# Patient Record
Sex: FEMALE | Race: WHITE | NOT HISPANIC OR LATINO | Employment: FULL TIME | URBAN - METROPOLITAN AREA
[De-identification: names, ages, dates, MRNs, and addresses within clinical notes are randomized per-mention and may not be internally consistent; named-entity substitution may affect disease eponyms.]

---

## 2018-08-01 ENCOUNTER — HOSPITAL ENCOUNTER (EMERGENCY)
Facility: HOSPITAL | Age: 28
Discharge: HOME | End: 2018-08-01
Attending: EMERGENCY MEDICINE
Payer: MEDICAID

## 2018-08-01 VITALS
OXYGEN SATURATION: 97 % | HEIGHT: 63 IN | BODY MASS INDEX: 33.31 KG/M2 | DIASTOLIC BLOOD PRESSURE: 71 MMHG | RESPIRATION RATE: 20 BRPM | TEMPERATURE: 99 F | WEIGHT: 188 LBS | HEART RATE: 86 BPM | SYSTOLIC BLOOD PRESSURE: 125 MMHG

## 2018-08-01 DIAGNOSIS — J40 BRONCHITIS: Primary | ICD-10-CM

## 2018-08-01 LAB — DEPRECATED S PYO AG THROAT QL EIA: NEGATIVE

## 2018-08-01 PROCEDURE — 87880 STREP A ASSAY W/OPTIC: CPT

## 2018-08-01 PROCEDURE — 99284 EMERGENCY DEPT VISIT MOD MDM: CPT

## 2018-08-01 PROCEDURE — 87081 CULTURE SCREEN ONLY: CPT

## 2018-08-01 RX ORDER — AMOXICILLIN AND CLAVULANATE POTASSIUM 875; 125 MG/1; MG/1
1 TABLET, FILM COATED ORAL 2 TIMES DAILY
Qty: 14 TABLET | Refills: 0 | Status: SHIPPED | OUTPATIENT
Start: 2018-08-01 | End: 2023-04-27 | Stop reason: DRUGHIGH

## 2018-08-01 RX ORDER — METHYLPREDNISOLONE 4 MG/1
TABLET ORAL
Qty: 1 PACKAGE | Refills: 0 | Status: SHIPPED | OUTPATIENT
Start: 2018-08-01 | End: 2018-08-22

## 2018-08-01 RX ORDER — ALBUTEROL SULFATE 90 UG/1
1-2 AEROSOL, METERED RESPIRATORY (INHALATION) EVERY 6 HOURS PRN
Qty: 1 INHALER | Refills: 0 | Status: SHIPPED | OUTPATIENT
Start: 2018-08-01 | End: 2023-06-29

## 2018-08-02 NOTE — ED PROVIDER NOTES
Encounter Date: 8/1/2018       History     Chief Complaint   Patient presents with    Nasal Congestion     Nasal congestion with sore throat and nonproductive cough X approx 2 weeks PTA.    Sore Throat    Cough     Patient is a 28-year-old female smoker with a 2 week history of nasal congestion draining down to the back of her throat, sore throat and resulting cough.  She states that she initially started improving and then over the weekend started relapsing again.          Review of patient's allergies indicates:  No Known Allergies  Past Medical History:   Diagnosis Date    Gastroenteritis     Thyroid condition      History reviewed. No pertinent surgical history.  History reviewed. No pertinent family history.  Social History   Substance Use Topics    Smoking status: Current Every Day Smoker     Packs/day: 0.50     Types: Cigarettes    Smokeless tobacco: Not on file    Alcohol use Yes      Comment: socially     Review of Systems   Constitutional: Negative for diaphoresis, fatigue and fever.        14 Point ROS completed and negative with the exception of HPI and Below.   HENT: Positive for postnasal drip, sinus pressure, sneezing and sore throat. Negative for congestion, ear pain, hearing loss, trouble swallowing and voice change.    Eyes: Negative for discharge and itching.   Respiratory: Positive for cough. Negative for chest tightness, shortness of breath and wheezing.    Cardiovascular: Negative for chest pain, palpitations and leg swelling.   Gastrointestinal: Negative for abdominal distention, abdominal pain, nausea and vomiting.   Genitourinary: Negative for dysuria, flank pain and frequency.   Musculoskeletal: Negative for back pain, neck pain and neck stiffness.   Skin: Negative for pallor, rash and wound.   Neurological: Negative for dizziness, syncope, facial asymmetry, weakness and headaches.   Hematological: Does not bruise/bleed easily.   Psychiatric/Behavioral: Negative for agitation,  behavioral problems and confusion.   All other systems reviewed and are negative.      Physical Exam     Initial Vitals [08/01/18 1921]   BP Pulse Resp Temp SpO2   (!) 144/73 95 20 98.9 °F (37.2 °C) 98 %      MAP       --         Physical Exam    Constitutional: She appears well-developed and well-nourished.   HENT:   Head: Normocephalic and atraumatic.   Right Ear: External ear normal.   Left Ear: External ear normal.   Lower turbinates erythematous with clear drainage.  Pharynx is slightly erythematous.   Eyes: Conjunctivae and EOM are normal. Pupils are equal, round, and reactive to light. Right eye exhibits no discharge. Left eye exhibits no discharge. No scleral icterus.   Neck: Normal range of motion. Neck supple. No thyromegaly present. No tracheal deviation present. No JVD present.   Cardiovascular: Normal rate, regular rhythm, normal heart sounds and intact distal pulses. Exam reveals no gallop and no friction rub.    No murmur heard.  Pulmonary/Chest: No stridor. No respiratory distress. She has wheezes. She has no rhonchi. She has no rales. She exhibits no tenderness.   Bronchial wheeze with nonproductive cough otherwise clear.   Abdominal: Soft. Bowel sounds are normal. She exhibits no distension and no mass. There is no tenderness. There is no rebound and no guarding.   Musculoskeletal: Normal range of motion. She exhibits no edema or tenderness.   Lymphadenopathy:     She has no cervical adenopathy.   Neurological: She is alert and oriented to person, place, and time. She has normal strength and normal reflexes. She displays normal reflexes. No cranial nerve deficit or sensory deficit.   Skin: Skin is warm and dry. Capillary refill takes less than 2 seconds. No rash and no abscess noted. No erythema. No pallor.   Psychiatric: She has a normal mood and affect. Her behavior is normal. Thought content normal.         ED Course   Procedures  Labs Reviewed   THROAT SCREEN, RAPID   CULTURE, STREP A,  THROAT           Imaging Results    None          Medical Decision Making:   Initial Assessment:   Patient is a 28-year-old female smoker with a 2 week history of nasal congestion draining down to the back of her throat, sore throat and resulting cough.  She states that she initially started improving and then over the weekend started relapsing again.  On exam the patient is awake, alert, oriented x3 with a nonproductive cough and bronchial wheeze.  Lower turbinates are slightly erythematous with clear drainage.  Pharynx has mild erythema noted.  Differential Diagnosis:   Sinusitis, upper respiratory infection, bronchitis                      Clinical Impression:   The encounter diagnosis was Bronchitis.                             Sami Acevedo PA-C  08/01/18 [id number]

## 2018-08-04 LAB — BACTERIA THROAT CULT: NORMAL

## 2020-04-02 ENCOUNTER — OFFICE VISIT (OUTPATIENT)
Dept: URGENT CARE | Facility: CLINIC | Age: 30
End: 2020-04-02
Payer: MEDICAID

## 2020-04-02 VITALS
WEIGHT: 180 LBS | TEMPERATURE: 99 F | BODY MASS INDEX: 33.13 KG/M2 | HEART RATE: 83 BPM | OXYGEN SATURATION: 98 % | HEIGHT: 62 IN

## 2020-04-02 DIAGNOSIS — M72.2 PLANTAR FASCIITIS OF RIGHT FOOT: Primary | ICD-10-CM

## 2020-04-02 DIAGNOSIS — S99.921A FOOT INJURY, RIGHT, INITIAL ENCOUNTER: ICD-10-CM

## 2020-04-02 PROCEDURE — 99214 OFFICE O/P EST MOD 30 MIN: CPT | Mod: S$GLB,,, | Performed by: PHYSICIAN ASSISTANT

## 2020-04-02 PROCEDURE — 99214 PR OFFICE/OUTPT VISIT, EST, LEVL IV, 30-39 MIN: ICD-10-PCS | Mod: S$GLB,,, | Performed by: PHYSICIAN ASSISTANT

## 2020-04-02 PROCEDURE — 73630 X-RAY EXAM OF FOOT: CPT | Mod: FY,RT,S$GLB, | Performed by: RADIOLOGY

## 2020-04-02 PROCEDURE — 73630 XR FOOT COMPLETE 3 VIEW RIGHT: ICD-10-PCS | Mod: FY,RT,S$GLB, | Performed by: RADIOLOGY

## 2020-04-03 NOTE — PATIENT INSTRUCTIONS
PLEASE READ YOUR DISCHARGE INSTRUCTIONS ENTIRELY AS IT CONTAINS IMPORTANT INFORMATION.  - Rest.    - Drink plenty of fluids.    - Tylenol as directed as needed for fever/pain.    - If you were prescribed antibiotics, please take them to completion.  - If you are female and on birth control pills - please use additional methods of contraception to prevent pregnancy while on antibiotics and for one cycle after.   - If you were prescribed a narcotic medication or muscle relaxer, do not drive or operate heavy equipment or machinery while taking these medications, as they can cause drowsiness.   - If you smoke, please stop smoking.  -You must understand that you've received an Urgent Care treatment only and that you may be released before all your medical problems are known or treated. You, the patient, will    arrange for follow up care as instructed. Please arrange follow up with your primary medical clinic as soon as possible.   - Follow up with your PCP or specialty clinic as directed in the next 1-2 weeks if not improved or as needed.  You can call [phone] to schedule an appointment with the appropriate provider.    - Please return to Urgent Care or to the Emergency Department if your symptoms worsen.    Patient aware and verbalized understanding.    Plantar Fasciitis  Plantar fasciitis is a painful swelling of the plantar fascia. The plantar fascia is a thick, fibrous layer of tissue. It covers the bones on the bottom of your foot. And it supports the foot bones in an arched position.  This can happen gradually or suddenly. It usually affects one foot at a time. Heel pain can be sharp, like a knife sticking into the bottom of your foot. You may feel pain after exercising, long-distance jogging, stair climbing, long periods of standing, or after standing up.  Risk factors include: non-active lifestyle, arthritis, diabetes, obesity or recent weight gain, flat foot, high arch. Wearing high heels, loose shoes,  or shoes with poor arch support for long periods of time adds to the risk. This problem is commonly found in runners and dancers. It also found in people who stand on hard surfaces for long periods of time.  Foot pain from this condition is usually worse in the morning. But it improves with walking. By the end of the day there may be a dull aching. Treatment requires short-term rest and controlling swelling. It may take up to 9 months before all symptoms go away. Rarely, a steroid injection into the foot, or surgery, may be needed.  Home care  · If you are overweight, lose weight to help healing.  · Choose supportive shoes with good arch support and shock absorbency. Replace athletic shoes when they become worn out. Dont walk or run barefoot.  · Premade or custom-fitted shoe inserts may be helpful. Inserts made of silicone seem to be the most effective. Custom-made inserts can be provided by a podiatrist or foot specialist, physical therapist, or orthopedist.  · Premade or custom-made night splints keep the heel stretched out while you sleep. They may prevent morning pain.  · Avoid activities that stress the feet: jogging, prolonged standing or walking, contact sports, etc.  · First thing in the morning and before sports, stretch the bottom of your feet. Gently flex your ankle so the toes move toward your knee.  · Icing may help control heel pain. Apply an ice pack to the heel for 10-20 minutes as a preventive. Or ice your heel after a severe flare-up of symptoms. You may repeat this every 1-2 hours as needed.  · You may use over-the-counter pain medicine to control pain, unless another medicine was prescribed. Anti-inflammatory pain medicines, such as ibuprofen or naproxen, may work better than acetaminophen. If you have chronic liver or kidney disease or ever had a stomach ulcer or GI bleeding, talk with your healthcare provider before using these medicines.  Follow-up care  Follow up with your healthcare  provider, physical therapist, or podiatrist or foot specialist as advised.  Call for an appointment if pain worsens or there is no relief after a few weeks of home treatment. Shoe inserts, a night splint, or a special boot may be required.  If X-rays were taken, you will be told of any new findings that may affect your care.  When to seek medical advice  Call your healthcare provider right away if any of these occur:  · Foot swelling  · Redness with increasing pain  Date Last Reviewed: 11/21/2015 © 2000-2017 [employer]. [address] [place] [id number]. All rights reserved. This information is not intended as a substitute for professional medical care. Always follow your healthcare professional's instructions.        Treating Plantar Fasciitis  First, your healthcare provider tries to determine the cause of your problem in order to suggest ways to relieve pain. If your pain is due to poor foot mechanics, custom-made shoe inserts (orthoses) may help.    Reduce symptoms  · To relieve mild symptoms, try aspirin, ibuprofen, or other medicines as directed. Rubbing ice on the affected area may also help.  · To reduce severe pain and swelling, your healthcare provider may prescribe pills or injections or a walking cast in some instances. Physical therapy, such as ultrasound or a daily stretching program, may also be recommended. Surgery is rarely required.  · To reduce symptoms caused by poor foot mechanics, your foot may be taped. This supports the arch and temporarily controls movement. Night splints may also help by stretching the fascia.  Control movement  If taping helps, your healthcare provider may prescribe orthoses. Built from plaster casts of your feet, these inserts control the way your foot moves. As a result, your symptoms should go away.  Reduce overuse  Every time your foot strikes the ground, the plantar fascia is stretched. You can reduce the strain on the plantar fascia and the  possibility of overuse by following these suggestions:  · Lose any excess weight.  · Avoid running on hard or uneven ground.  · Use orthoses at all times in your shoes and house slippers.  If surgery is needed  Your healthcare provider may consider surgery if other types of treatment don't control your pain. During surgery, the plantar fascia is partially cut to release tension. As you heal, fibrous tissue fills the space between the heel bone and the plantar fascia.   Date Last Reviewed: 10/14/2015  © 0738-9314 [employer]. [address] [place] [address]. All rights reserved. This information is not intended as a substitute for professional medical care. Always follow your healthcare professional's instructions.

## 2020-04-03 NOTE — PROGRESS NOTES
"Subjective:       Patient ID: Nirali Chavez is a 30 y.o. female.    Vitals:  height is 5' 2" (1.575 m) and weight is 81.6 kg (180 lb). Her temperature is 98.8 °F (37.1 °C). Her pulse is 83. Her oxygen saturation is 98%.     Chief Complaint: Foot Injury (Right Foot )    Ms. Chavez presents for evaluation of right foot pain.  She reports she is a  and was doing a lot of walking during [place].  She denies any trauma.  She has had pain since that time.  She is having pain on the bottom of her foot and in her heel.  Pain is worse with walking.  She does have shoe insoles that she wears on a regular basis.  She has tried ibuprofen for her symptoms without relief.    Foot Injury    The incident occurred more than 1 week ago (02/25/2020). The incident occurred in the street. The injury mechanism is unknown. The pain is present in the right foot. The pain is at a severity of 6/10. The pain is moderate. The pain has been worsening since onset. Pertinent negatives include no inability to bear weight. She reports no foreign bodies present. The symptoms are aggravated by weight bearing. She has tried nothing for the symptoms. The treatment provided no relief.       Constitution: Negative for fatigue.   HENT: Negative for facial swelling and facial trauma.    Neck: Negative for neck stiffness.   Cardiovascular: Negative for chest trauma.   Eyes: Negative for eye trauma, double vision and blurred vision.   Gastrointestinal: Negative for abdominal trauma, abdominal pain and rectal bleeding.   Genitourinary: Negative for hematuria, missed menses, genital trauma and pelvic pain.   Musculoskeletal: Positive for pain. Negative for trauma, joint pain, joint swelling and abnormal ROM of joint.   Skin: Negative for color change, wound, abrasion, laceration and bruising.   Neurological: Negative for dizziness, history of vertigo, light-headedness, coordination disturbances, altered mental status and loss of " consciousness.   Hematologic/Lymphatic: Negative for history of bleeding disorder.   Psychiatric/Behavioral: Negative for altered mental status.       Objective:      Physical Exam   Constitutional: She is oriented to person, place, and time. She appears well-developed and well-nourished. She is cooperative.  Non-toxic appearance. She does not appear ill. No distress.   HENT:   Head: Normocephalic and atraumatic.   Right Ear: Hearing and external ear normal.   Left Ear: Hearing and external ear normal.   Nose: Nose normal. No rhinorrhea or nasal deformity. No epistaxis.   Mouth/Throat: Mucous membranes are normal.   Eyes: Conjunctivae and lids are normal. Right eye exhibits no discharge. Left eye exhibits no discharge. No scleral icterus.   Neck: Trachea normal, normal range of motion, full passive range of motion without pain and phonation normal. Neck supple.   Cardiovascular: Normal rate, regular rhythm, normal heart sounds, intact distal pulses and normal pulses.   Pulmonary/Chest: Effort normal and breath sounds normal. No stridor. No respiratory distress. She has no decreased breath sounds. She has no wheezes. She has no rhonchi. She has no rales.   Abdominal: Soft. Normal appearance and bowel sounds are normal. She exhibits no distension, no pulsatile midline mass and no mass. There is no tenderness.   Musculoskeletal: Normal range of motion. She exhibits no edema or deformity.        Right foot: There is tenderness. There is normal range of motion, no bony tenderness, no swelling, normal capillary refill, no crepitus, no deformity and no laceration.        Feet:    TTP right plantar fascia    Neurological: She is alert and oriented to person, place, and time. She exhibits normal muscle tone. Coordination normal.   Skin: Skin is warm, dry, intact, not diaphoretic and not pale. not right foot  Psychiatric: She has a normal mood and affect. Her speech is normal and behavior is normal. Judgment and thought  content normal. Cognition and memory are normal.   Nursing note and vitals reviewed.      XR R foot - No acute bony abnormality.    Assessment:       1. Plantar fasciitis of right foot    2. Foot injury, right, initial encounter        Plan:         Plantar fasciitis of right foot    Foot injury, right, initial encounter  -     XR FOOT COMPLETE 3 VIEW RIGHT; Future; Expected date: 04/02/2020    Patient given stretching instructions.  Also suggested a plantar fasciitis support.     Patient Instructions     PLEASE READ YOUR DISCHARGE INSTRUCTIONS ENTIRELY AS IT CONTAINS IMPORTANT INFORMATION.  - Rest.    - Drink plenty of fluids.    - Tylenol as directed as needed for fever/pain.    - If you were prescribed antibiotics, please take them to completion.  - If you are female and on birth control pills - please use additional methods of contraception to prevent pregnancy while on antibiotics and for one cycle after.   - If you were prescribed a narcotic medication or muscle relaxer, do not drive or operate heavy equipment or machinery while taking these medications, as they can cause drowsiness.   - If you smoke, please stop smoking.  -You must understand that you've received an Urgent Care treatment only and that you may be released before all your medical problems are known or treated. You, the patient, will    arrange for follow up care as instructed. Please arrange follow up with your primary medical clinic as soon as possible.   - Follow up with your PCP or specialty clinic as directed in the next 1-2 weeks if not improved or as needed.  You can call [phone] to schedule an appointment with the appropriate provider.    - Please return to Urgent Care or to the Emergency Department if your symptoms worsen.    Patient aware and verbalized understanding.    Plantar Fasciitis  Plantar fasciitis is a painful swelling of the plantar fascia. The plantar fascia is a thick, fibrous layer of tissue. It covers the bones on  the bottom of your foot. And it supports the foot bones in an arched position.  This can happen gradually or suddenly. It usually affects one foot at a time. Heel pain can be sharp, like a knife sticking into the bottom of your foot. You may feel pain after exercising, long-distance jogging, stair climbing, long periods of standing, or after standing up.  Risk factors include: non-active lifestyle, arthritis, diabetes, obesity or recent weight gain, flat foot, high arch. Wearing high heels, loose shoes, or shoes with poor arch support for long periods of time adds to the risk. This problem is commonly found in runners and dancers. It also found in people who stand on hard surfaces for long periods of time.  Foot pain from this condition is usually worse in the morning. But it improves with walking. By the end of the day there may be a dull aching. Treatment requires short-term rest and controlling swelling. It may take up to 9 months before all symptoms go away. Rarely, a steroid injection into the foot, or surgery, may be needed.  Home care  · If you are overweight, lose weight to help healing.  · Choose supportive shoes with good arch support and shock absorbency. Replace athletic shoes when they become worn out. Dont walk or run barefoot.  · Premade or custom-fitted shoe inserts may be helpful. Inserts made of silicone seem to be the most effective. Custom-made inserts can be provided by a podiatrist or foot specialist, physical therapist, or orthopedist.  · Premade or custom-made night splints keep the heel stretched out while you sleep. They may prevent morning pain.  · Avoid activities that stress the feet: jogging, prolonged standing or walking, contact sports, etc.  · First thing in the morning and before sports, stretch the bottom of your feet. Gently flex your ankle so the toes move toward your knee.  · Icing may help control heel pain. Apply an ice pack to the heel for 10-20 minutes as a preventive. Or  ice your heel after a severe flare-up of symptoms. You may repeat this every 1-2 hours as needed.  · You may use over-the-counter pain medicine to control pain, unless another medicine was prescribed. Anti-inflammatory pain medicines, such as ibuprofen or naproxen, may work better than acetaminophen. If you have chronic liver or kidney disease or ever had a stomach ulcer or GI bleeding, talk with your healthcare provider before using these medicines.  Follow-up care  Follow up with your healthcare provider, physical therapist, or podiatrist or foot specialist as advised.  Call for an appointment if pain worsens or there is no relief after a few weeks of home treatment. Shoe inserts, a night splint, or a special boot may be required.  If X-rays were taken, you will be told of any new findings that may affect your care.  When to seek medical advice  Call your healthcare provider right away if any of these occur:  · Foot swelling  · Redness with increasing pain  Date Last Reviewed: 11/21/2015  © 7050-6201 [employer]. [address] [place] [address]. All rights reserved. This information is not intended as a substitute for professional medical care. Always follow your healthcare professional's instructions.        Treating Plantar Fasciitis  First, your healthcare provider tries to determine the cause of your problem in order to suggest ways to relieve pain. If your pain is due to poor foot mechanics, custom-made shoe inserts (orthoses) may help.    Reduce symptoms  · To relieve mild symptoms, try aspirin, ibuprofen, or other medicines as directed. Rubbing ice on the affected area may also help.  · To reduce severe pain and swelling, your healthcare provider may prescribe pills or injections or a walking cast in some instances. Physical therapy, such as ultrasound or a daily stretching program, may also be recommended. Surgery is rarely required.  · To reduce symptoms caused by poor foot  mechanics, your foot may be taped. This supports the arch and temporarily controls movement. Night splints may also help by stretching the fascia.  Control movement  If taping helps, your healthcare provider may prescribe orthoses. Built from plaster casts of your feet, these inserts control the way your foot moves. As a result, your symptoms should go away.  Reduce overuse  Every time your foot strikes the ground, the plantar fascia is stretched. You can reduce the strain on the plantar fascia and the possibility of overuse by following these suggestions:  · Lose any excess weight.  · Avoid running on hard or uneven ground.  · Use orthoses at all times in your shoes and house slippers.  If surgery is needed  Your healthcare provider may consider surgery if other types of treatment don't control your pain. During surgery, the plantar fascia is partially cut to release tension. As you heal, fibrous tissue fills the space between the heel bone and the plantar fascia.   Date Last Reviewed: 10/14/2015  © 3825-8805 [employer]. [address] [place] [address], [place] [address]. All rights reserved. This information is not intended as a substitute for professional medical care. Always follow your healthcare professional's instructions.

## 2020-11-08 ENCOUNTER — OFFICE VISIT (OUTPATIENT)
Dept: URGENT CARE | Facility: CLINIC | Age: 30
End: 2020-11-08
Payer: MEDICAID

## 2020-11-08 VITALS
TEMPERATURE: 98 F | HEART RATE: 89 BPM | HEIGHT: 63 IN | SYSTOLIC BLOOD PRESSURE: 126 MMHG | BODY MASS INDEX: 31.89 KG/M2 | WEIGHT: 180 LBS | OXYGEN SATURATION: 96 % | DIASTOLIC BLOOD PRESSURE: 85 MMHG

## 2020-11-08 DIAGNOSIS — J40 BRONCHITIS: ICD-10-CM

## 2020-11-08 DIAGNOSIS — R05.9 COUGH: Primary | ICD-10-CM

## 2020-11-08 LAB
CTP QC/QA: YES
SARS-COV-2 RDRP RESP QL NAA+PROBE: NEGATIVE

## 2020-11-08 PROCEDURE — 99213 PR OFFICE/OUTPT VISIT, EST, LEVL III, 20-29 MIN: ICD-10-PCS | Mod: S$GLB,CS,, | Performed by: FAMILY MEDICINE

## 2020-11-08 PROCEDURE — 99213 OFFICE O/P EST LOW 20 MIN: CPT | Mod: S$GLB,CS,, | Performed by: FAMILY MEDICINE

## 2020-11-08 PROCEDURE — U0002 COVID-19 LAB TEST NON-CDC: HCPCS | Mod: QW,S$GLB,, | Performed by: FAMILY MEDICINE

## 2020-11-08 PROCEDURE — U0002: ICD-10-PCS | Mod: QW,S$GLB,, | Performed by: FAMILY MEDICINE

## 2020-11-08 RX ORDER — LORATADINE 10 MG/1
10 TABLET ORAL DAILY
Qty: 30 TABLET | Refills: 2 | Status: SHIPPED | OUTPATIENT
Start: 2020-11-08 | End: 2023-06-29

## 2020-11-08 RX ORDER — PROMETHAZINE HYDROCHLORIDE AND DEXTROMETHORPHAN HYDROBROMIDE 6.25; 15 MG/5ML; MG/5ML
SYRUP ORAL
Qty: 180 ML | Refills: 0 | Status: SHIPPED | OUTPATIENT
Start: 2020-11-08 | End: 2023-06-29

## 2020-11-08 RX ORDER — PREDNISONE 20 MG/1
20 TABLET ORAL DAILY
Qty: 5 TABLET | Refills: 0 | Status: SHIPPED | OUTPATIENT
Start: 2020-11-08 | End: 2020-11-13

## 2020-11-09 NOTE — PROGRESS NOTES
"Subjective:       Patient ID: Nirali Chavez is a 30 y.o. female.    Vitals:  height is 5' 3" (1.6 m) and weight is 81.6 kg (180 lb). Her temperature is 97.9 °F (36.6 °C). Her blood pressure is 126/85 and her pulse is 89. Her oxygen saturation is 96%.     Chief Complaint: Cough, Shortness of Breath, and Diarrhea    31 y/o female with c/o SOB and diarrhea for last few days, patient is a smoker half pack a day or less for last several years denies any known COVID exposure denies any fever or chills    ROS    Objective:      Physical Exam   Constitutional: She is oriented to person, place, and time. She appears well-developed. She is cooperative.  Non-toxic appearance. She does not appear ill. No distress.   HENT:   Head: Normocephalic and atraumatic.   Ears:   Right Ear: Hearing, tympanic membrane, external ear and ear canal normal.   Left Ear: Hearing, tympanic membrane, external ear and ear canal normal.   Nose: Nose normal. No mucosal edema, rhinorrhea or nasal deformity. No epistaxis. Right sinus exhibits no maxillary sinus tenderness and no frontal sinus tenderness. Left sinus exhibits no maxillary sinus tenderness and no frontal sinus tenderness.   Mouth/Throat: Uvula is midline, oropharynx is clear and moist and mucous membranes are normal. No trismus in the jaw. Normal dentition. No uvula swelling. No posterior oropharyngeal erythema.   Eyes: Conjunctivae and lids are normal. Right eye exhibits no discharge. Left eye exhibits no discharge. No scleral icterus.   Neck: Trachea normal, normal range of motion, full passive range of motion without pain and phonation normal. Neck supple.   Cardiovascular: Normal rate, regular rhythm, normal heart sounds and normal pulses.   Pulmonary/Chest: Effort normal and breath sounds normal. No stridor. No respiratory distress. She has no wheezes. She has no rhonchi. She has no rales. She exhibits no tenderness.   Abdominal: Soft. Normal appearance and bowel sounds are normal. " She exhibits no distension, no pulsatile midline mass and no mass. There is no abdominal tenderness.   Musculoskeletal: Normal range of motion.         General: No deformity.   Neurological: She is alert and oriented to person, place, and time. She exhibits normal muscle tone. Coordination normal.   Skin: Skin is warm, dry, intact, not diaphoretic and not pale. Psychiatric: Her speech is normal and behavior is normal. Judgment and thought content normal.   Nursing note and vitals reviewed.        Assessment:       1. Cough    2. Bronchitis        Plan:         Cough  -     POCT COVID-19 Rapid Screening    Bronchitis    Other orders  -     promethazine-dextromethorphan (PROMETHAZINE-DM) 6.25-15 mg/5 mL Syrp; Take one tsp po q 6 hrs prn cough  Dispense: 180 mL; Refill: 0  -     loratadine (CLARITIN) 10 mg tablet; Take 1 tablet (10 mg total) by mouth once daily.  Dispense: 30 tablet; Refill: 2  -     predniSONE (DELTASONE) 20 MG tablet; Take 1 tablet (20 mg total) by mouth once daily. for 5 days  Dispense: 5 tablet; Refill: 0          Patient advised to monitor her vitals take medications as prescribed counseled smoking cessation

## 2020-11-09 NOTE — PROGRESS NOTES
"Subjective:       Patient ID: Nirali Chavez is a 30 y.o. female.    Vitals:  height is 5' 3" (1.6 m) and weight is 81.6 kg (180 lb). Her temperature is 97.9 °F (36.6 °C). Her blood pressure is 126/85 and her pulse is 89. Her oxygen saturation is 96%.     Chief Complaint: Cough, Shortness of Breath, and Diarrhea    P/o of cough ,diarrhea  ,congestion, loss of taste, all started las Sunday      Constitution: Negative for chills, sweating, fatigue and fever.   HENT: Positive for congestion and sore throat. Negative for ear pain, sinus pain, sinus pressure and voice change.    Neck: Negative for painful lymph nodes.   Cardiovascular: Negative for chest pain and leg swelling.   Eyes: Negative for eye redness, double vision and blurred vision.   Respiratory: Positive for cough and shortness of breath. Negative for chest tightness, sputum production, bloody sputum, COPD, stridor, wheezing and asthma.    Gastrointestinal: Positive for diarrhea. Negative for nausea and vomiting.   Genitourinary: Negative for dysuria, frequency, urgency and history of kidney stones.   Musculoskeletal: Negative for joint pain, joint swelling, muscle cramps and muscle ache.   Skin: Negative for color change, pale, rash and bruising.   Allergic/Immunologic: Negative for seasonal allergies and asthma.   Neurological: Positive for headaches. Negative for dizziness, history of vertigo, light-headedness and passing out.   Hematologic/Lymphatic: Negative for swollen lymph nodes.   Psychiatric/Behavioral: Negative for nervous/anxious, sleep disturbance and depression. The patient is not nervous/anxious.        Objective:      Physical Exam      Assessment:       No diagnosis found.    Plan:         There are no diagnoses linked to this encounter.       "

## 2020-11-09 NOTE — PATIENT INSTRUCTIONS
What Is Acute Bronchitis?  Acute bronchitis is when the airways in your lungs (bronchial tubes) become red and swollen (inflamed). It is usually caused by a viral infection. But it can also occur because of a bacteria or allergen. Symptoms include a cough that produces yellow or greenish mucus and can last for days or sometimes weeks.  Inside healthy lungs    Air travels in and out of the lungs through the airways. The linings of these airways produce sticky mucus. This mucus traps particles that enter the lungs. Tiny structures called cilia then sweep the particles out of the airways.     Healthy airway: Airways are normally open. Air moves in and out easily.      Healthy cilia: Tiny, hairlike cilia sweep mucus and particles up and out of the airways.   Lungs with bronchitis  Bronchitis often occurs with a cold or the flu virus. The airways become inflamed (red and swollen). There is a deep hacking cough from the extra mucus. Other symptoms may include:  · Wheezing  · Chest discomfort  · Shortness of breath  · Mild fever  A second infection, this time due to bacteria, may then occur. And airways irritated by allergens or smoke are more likely to get infected.        Inflamed airway: Inflammation and extra mucus narrow the airway, causing shortness of breath.      Impaired cilia: Extra mucus impairs cilia, causing congestion and wheezing. Smoking makes the problem worse.   Making a diagnosis  A physical exam, health history, and certain tests help your healthcare provider make the diagnosis.  Health history  Your healthcare provider will ask you about your symptoms.  The exam  Your provider listens to your chest for signs of congestion. He or she may also check your ears, nose, and throat.  Possible tests  · A sputum test for bacteria. This requires a sample of mucus from your lungs.  · A nasal or throat swab. This tests to see if you have a bacterial infection.  · A chest X-ray. This is done if your healthcare  provider thinks you have pneumonia.  · Tests to check for an underlying condition. Other tests may be done to check for things such as allergies, asthma, or COPD (chronic obstructive pulmonary disease). You may need to see a specialist for more lung function testing.  Treating a cough  The main treatment for bronchitis is easing symptoms. Avoiding smoke, allergens, and other things that trigger coughing can often help. If the infection is bacterial, you may be given antibiotics. During the illness, it's important to get plenty of sleep. To ease symptoms:  · Dont smoke. Also avoid secondhand smoke.  · Use a humidifier. Or try breathing in steam from a hot shower. This may help loosen mucus.  · Drink a lot of water and juice. They can soothe the throat and may help thin mucus.  · Sit up or use extra pillows when in bed. This helps to lessen coughing and congestion.  · Ask your provider about using medicine. Ask about using cough medicine, pain and fever medicine, or a decongestant.  Antibiotics  Most cases of bronchitis are caused by cold or flu viruses. They dont need antibiotics to treat them, even if your mucus is thick and green or yellow. Antibiotics dont treat viral illness and antibiotics have not been shown to have any benefit in cases of acute bronchitis. Taking antibiotics when they are not needed increases your risk of getting an infection later that is antibiotic-resistant. Antibiotics can also cause severe cases of diarrhea that require other antibiotics to treat.  It is important that you accept your healthcare provider's opinion to not use antibiotics. Your provider will prescribe antibiotics if the infection is caused by bacteria. If they are prescribed:  · Take all of the medicine. Take the medicine until it is used up, even if symptoms have improved. If you dont, the bronchitis may come back.  · Take the medicines as directed. For instance, some medicines should be taken with food.  · Ask about  side effects. Ask your provider or pharmacist what side effects are common, and what to do about them.  Follow-up care  You should see your provider again in 2 to 3 weeks. By this time, symptoms should have improved. An infection that lasts longer may mean you have a more serious problem.  Prevention  · Avoid tobacco smoke. If you smoke, quit. Stay away from smoky places. Ask friends and family not to smoke around you, or in your home or car.  · Get checked for allergies.  · Ask your provider about getting a yearly flu shot. Also ask about pneumococcal or pneumonia shots.  · Wash your hands often. This helps reduce the chance of picking up viruses that cause colds and flu.  Call your healthcare provider if:  · Symptoms worsen, or you have new symptoms  · Breathing problems worsen or  become severe  · Symptoms dont get better within a week, or within 3 days of taking antibiotics   Date Last Reviewed: 2/1/2017  © 0143-3081 [employer]. [address] [place] [address], [place] [address]. All rights reserved. This information is not intended as a substitute for professional medical care. Always follow your healthcare professional's instructions.

## 2022-11-05 ENCOUNTER — OFFICE VISIT (OUTPATIENT)
Dept: URGENT CARE | Facility: CLINIC | Age: 32
End: 2022-11-05
Payer: COMMERCIAL

## 2022-11-05 VITALS
RESPIRATION RATE: 16 BRPM | BODY MASS INDEX: 31.89 KG/M2 | TEMPERATURE: 98 F | HEIGHT: 63 IN | HEART RATE: 56 BPM | WEIGHT: 180 LBS | OXYGEN SATURATION: 97 % | DIASTOLIC BLOOD PRESSURE: 79 MMHG | SYSTOLIC BLOOD PRESSURE: 140 MMHG

## 2022-11-05 DIAGNOSIS — B02.8 HERPES ZOSTER WITH COMPLICATION: Primary | ICD-10-CM

## 2022-11-05 DIAGNOSIS — J06.9 UPPER RESPIRATORY TRACT INFECTION, UNSPECIFIED TYPE: ICD-10-CM

## 2022-11-05 DIAGNOSIS — J02.9 SORE THROAT: ICD-10-CM

## 2022-11-05 PROBLEM — B02.9 SHINGLES: Status: ACTIVE | Noted: 2022-11-05

## 2022-11-05 LAB
CTP QC/QA: YES
MOLECULAR STREP A: NEGATIVE

## 2022-11-05 PROCEDURE — 3008F PR BODY MASS INDEX (BMI) DOCUMENTED: ICD-10-PCS | Mod: CPTII,S$GLB,, | Performed by: INTERNAL MEDICINE

## 2022-11-05 PROCEDURE — 3078F DIAST BP <80 MM HG: CPT | Mod: CPTII,S$GLB,, | Performed by: INTERNAL MEDICINE

## 2022-11-05 PROCEDURE — 99214 PR OFFICE/OUTPT VISIT, EST, LEVL IV, 30-39 MIN: ICD-10-PCS | Mod: S$GLB,,, | Performed by: INTERNAL MEDICINE

## 2022-11-05 PROCEDURE — 3077F SYST BP >= 140 MM HG: CPT | Mod: CPTII,S$GLB,, | Performed by: INTERNAL MEDICINE

## 2022-11-05 PROCEDURE — 1159F MED LIST DOCD IN RCRD: CPT | Mod: CPTII,S$GLB,, | Performed by: INTERNAL MEDICINE

## 2022-11-05 PROCEDURE — 87651 STREP A DNA AMP PROBE: CPT | Mod: QW,S$GLB,, | Performed by: INTERNAL MEDICINE

## 2022-11-05 PROCEDURE — 3008F BODY MASS INDEX DOCD: CPT | Mod: CPTII,S$GLB,, | Performed by: INTERNAL MEDICINE

## 2022-11-05 PROCEDURE — 87651 POCT STREP A MOLECULAR: ICD-10-PCS | Mod: QW,S$GLB,, | Performed by: INTERNAL MEDICINE

## 2022-11-05 PROCEDURE — 99214 OFFICE O/P EST MOD 30 MIN: CPT | Mod: S$GLB,,, | Performed by: INTERNAL MEDICINE

## 2022-11-05 PROCEDURE — 1159F PR MEDICATION LIST DOCUMENTED IN MEDICAL RECORD: ICD-10-PCS | Mod: CPTII,S$GLB,, | Performed by: INTERNAL MEDICINE

## 2022-11-05 PROCEDURE — 3078F PR MOST RECENT DIASTOLIC BLOOD PRESSURE < 80 MM HG: ICD-10-PCS | Mod: CPTII,S$GLB,, | Performed by: INTERNAL MEDICINE

## 2022-11-05 PROCEDURE — 3077F PR MOST RECENT SYSTOLIC BLOOD PRESSURE >= 140 MM HG: ICD-10-PCS | Mod: CPTII,S$GLB,, | Performed by: INTERNAL MEDICINE

## 2022-11-05 RX ORDER — SERTRALINE HYDROCHLORIDE 100 MG/1
100 TABLET, FILM COATED ORAL NIGHTLY
COMMUNITY
Start: 2022-11-02

## 2022-11-05 RX ORDER — DEXTROAMPHETAMINE SULFATE, DEXTROAMPHETAMINE SACCHARATE, AMPHETAMINE SULFATE AND AMPHETAMINE ASPARTATE 2.5; 2.5; 2.5; 2.5 MG/1; MG/1; MG/1; MG/1
CAPSULE, EXTENDED RELEASE ORAL DAILY PRN
COMMUNITY
Start: 2022-11-02 | End: 2023-06-29

## 2022-11-05 RX ORDER — DEXTROAMPHETAMINE SULFATE, DEXTROAMPHETAMINE SACCHARATE, AMPHETAMINE SULFATE AND AMPHETAMINE ASPARTATE 7.5; 7.5; 7.5; 7.5 MG/1; MG/1; MG/1; MG/1
CAPSULE, EXTENDED RELEASE ORAL DAILY PRN
COMMUNITY
Start: 2022-10-31 | End: 2023-06-29

## 2022-11-05 RX ORDER — FAMCICLOVIR 500 MG/1
500 TABLET ORAL 3 TIMES DAILY
Qty: 21 TABLET | Refills: 0 | Status: SHIPPED | OUTPATIENT
Start: 2022-11-05 | End: 2022-11-12

## 2022-11-05 RX ORDER — BENZONATATE 100 MG/1
100 CAPSULE ORAL 2 TIMES DAILY PRN
Qty: 14 CAPSULE | Refills: 0 | Status: SHIPPED | OUTPATIENT
Start: 2022-11-05 | End: 2022-11-12

## 2022-11-05 NOTE — PROGRESS NOTES
"Subjective:       Patient ID: Nirali Chavez is a 32 y.o. female.    Vitals:  height is 5' 3" (1.6 m) and weight is 81.6 kg (180 lb). Her oral temperature is 97.5 °F (36.4 °C). Her blood pressure is 140/79 (abnormal) and her pulse is 56 (abnormal). Her respiration is 16 and oxygen saturation is 97%.     Chief Complaint: Sore Throat    Pt presents with rash on rt side of back sore throat , rt ear fullness, wheezing, congestion, swollen glands x 1 week. Pt also has a bump on her abdomen x 2 months. Pt has known exposure to flu. Otc not taken and pain scale is 6/10    Sore Throat   This is a new problem. The current episode started 1 to 4 weeks ago. The problem has been gradually worsening. The maximum temperature recorded prior to her arrival was 101 - 101.9 F. The pain is at a severity of 6/10. The pain is moderate. Associated symptoms include a plugged ear sensation. She has had no exposure to strep or mono. She has tried nothing for the symptoms. The treatment provided no relief.     HENT:  Positive for sore throat.      Objective:      Physical Exam   Constitutional: She is oriented to person, place, and time. She appears well-developed. She is cooperative.  Non-toxic appearance. She does not appear ill. No distress.   HENT:   Head: Normocephalic and atraumatic.   Ears:   Right Ear: Hearing, tympanic membrane, external ear and ear canal normal.   Left Ear: Hearing, tympanic membrane, external ear and ear canal normal.   Nose: Rhinorrhea present. No mucosal edema or nasal deformity. No epistaxis. Right sinus exhibits no maxillary sinus tenderness and no frontal sinus tenderness. Left sinus exhibits no maxillary sinus tenderness and no frontal sinus tenderness.   Mouth/Throat: Uvula is midline, oropharynx is clear and moist and mucous membranes are normal. No trismus in the jaw. Normal dentition. No uvula swelling. No oropharyngeal exudate, posterior oropharyngeal edema or posterior oropharyngeal erythema.   Eyes: " Conjunctivae and lids are normal. No scleral icterus.   Neck: Trachea normal and phonation normal. Neck supple. No edema present. No erythema present. No neck rigidity present.   Cardiovascular: Normal rate, regular rhythm, normal heart sounds and normal pulses.   Pulmonary/Chest: Effort normal and breath sounds normal. No respiratory distress. She has no decreased breath sounds. She has no rhonchi.   Abdominal: Normal appearance.   Musculoskeletal: Normal range of motion.         General: No deformity. Normal range of motion.   Neurological: She is alert and oriented to person, place, and time. She exhibits normal muscle tone. Coordination normal.   Skin: Skin is warm, dry, intact, not diaphoretic, not pale and rash (right lower abdomen vesicular rash.).   Psychiatric: Her speech is normal and behavior is normal. Judgment and thought content normal.   Nursing note and vitals reviewed.      Assessment:       1. Herpes zoster with complication    2. Sore throat    3. Upper respiratory tract infection, unspecified type          Plan:         Herpes zoster with complication  -     famciclovir (FAMVIR) 500 MG tablet; Take 1 tablet (500 mg total) by mouth 3 (three) times daily. for 7 days  Dispense: 21 tablet; Refill: 0    Sore throat  -     POCT Strep A, Molecular    Upper respiratory tract infection, unspecified type  -     benzonatate (TESSALON PERLES) 100 MG capsule; Take 1 capsule (100 mg total) by mouth 2 (two) times daily as needed for Cough.  Dispense: 14 capsule; Refill: 0       Patient Instructions   If your condition worsens we recommend that you receive another evaluation at the emergency room immediately or contact your primary medical clinics after hours call service to discuss your concerns. You must understand that you've received an Urgent Care treatment only and that you may be released before all of your medical problems are known or treated. You, the patient, will arrange for follow up care as  instructed.  Drink plenty of Fluids  Wash hands frequently using mild antibacterial soap lathering for at least 15 seconds then rinse  Get plenty of Rest  Follow up in 1-2 weeks with Primary Care physician if not significantly better.   If you are not allergic please take Tylenol every 4-6 hours as needed and/or Ibuprofen every 6-8 hours as needed, over the counter for pain or fever.    My notes were dictated with [employer]ency Software. Any misspellings or nonsensical grammar should be attributed to its use and allowances made for errors and typographic syntactical error(s).

## 2023-01-10 LAB
ABO + RH BLD: NORMAL
ANTIBODY SCREEN: NEGATIVE
RPR: NON REACTIVE
RUBELLA IMMUNE STATUS: NORMAL

## 2023-02-16 LAB
C TRACH RRNA SPEC QL PROBE: NEGATIVE
N GONORRHOEAE, AMPLIFIED DNA: NEGATIVE

## 2023-03-28 ENCOUNTER — TELEPHONE (OUTPATIENT)
Dept: MATERNAL FETAL MEDICINE | Facility: CLINIC | Age: 33
End: 2023-03-28
Payer: MEDICAID

## 2023-03-28 ENCOUNTER — PATIENT MESSAGE (OUTPATIENT)
Dept: MATERNAL FETAL MEDICINE | Facility: CLINIC | Age: 33
End: 2023-03-28
Payer: MEDICAID

## 2023-03-28 NOTE — TELEPHONE ENCOUNTER
Reached out to patient to schedule [facility] ultrasound and consult.     Virtual Visit with [facility] Prenatal genetic counselor scheduled for 3/31/23 at 11am and then [facility] at [facility] on 4/3/23 at 10am.    Pt verbalized understanding of information.

## 2023-03-30 DIAGNOSIS — Z36.89 ENCOUNTER FOR FETAL ANATOMIC SURVEY: Primary | ICD-10-CM

## 2023-03-31 ENCOUNTER — OFFICE VISIT (OUTPATIENT)
Dept: MATERNAL FETAL MEDICINE | Facility: CLINIC | Age: 33
End: 2023-03-31
Payer: COMMERCIAL

## 2023-03-31 DIAGNOSIS — O28.5 MATERNAL SERUM SCREEN POSITIVE FOR TRISOMY 21: Primary | ICD-10-CM

## 2023-03-31 PROCEDURE — 99499 NO LOS: ICD-10-PCS | Mod: 95,,, | Performed by: GENETIC COUNSELOR, MS

## 2023-03-31 PROCEDURE — 99499 UNLISTED E&M SERVICE: CPT | Mod: 95,,, | Performed by: GENETIC COUNSELOR, MS

## 2023-03-31 PROCEDURE — 96040 PR GENETIC COUNSELING, EACH 30 MIN: CPT | Mod: 95,,, | Performed by: GENETIC COUNSELOR, MS

## 2023-03-31 PROCEDURE — 96040 PR GENETIC COUNSELING, EACH 30 MIN: ICD-10-PCS | Mod: 95,,, | Performed by: GENETIC COUNSELOR, MS

## 2023-03-31 NOTE — PROGRESS NOTES
The patient location is: Home ([place])  The chief complaint leading to consultation is: Positive serum screen    Visit type: audiovisual    Face to Face time with patient: 30 min  45 minutes of total time spent on the encounter, which includes face to face time and non-face to face time preparing to see the patient (eg, review of tests), Obtaining and/or reviewing separately obtained history, Documenting clinical information in the electronic or other health record, Independently interpreting results (not separately reported) and communicating results to the patient/family/caregiver, or Care coordination (not separately reported).         Each patient to whom he or she provides medical services by telemedicine is:  (1) informed of the relationship between the physician and patient and the respective role of any other health care provider with respect to management of the patient; and (2) notified that he or she may decline to receive medical services by telemedicine and may withdraw from such care at any time.    Notes:     Office Visit - Genetic Counseling Evaluation   Nirali Chavez  : 1990  MRN: [id number]  REFERRING PROVIDER: Dr. Harrison Edwards  PARTNER NAME: Bernabe    DATE OF SERVICE: 3/31/23  TIME SPENT: 30 min    REASON FOR CONSULT:  Nirali Chavez, a 33 y.o. female with a positive maternal serum screen for Down Syndrome followed by a negative NIPT was referred for genetic counseling to discuss these prior screens. She came to the appointment alone.     OBSETRIC HISTORY   AGE AT CLEMENTINA: 33y  CLEMENT[place]: 2023  GESTATION: [place]  GESTATIONAL AGE:17w4d    PREGNANCY HISTORY  G1: Term-   G2:Current    MEDICAL HISTORY:  MEDICATIONS/EXPOSURES: Not discussed today  Patient Active Problem List   Diagnosis    URI (upper respiratory infection)    Shingles       Current Outpatient Medications:     ADDERALL XR 10 mg 24 hr capsule, Take by mouth daily as needed., Disp: , Rfl:     ADDERALL XR 30 mg 24 hr capsule, Take by  mouth daily as needed., Disp: , Rfl:     albuterol 90 mcg/actuation inhaler, Inhale 1-2 puffs into the lungs every 6 (six) hours as needed for Wheezing. Rescue, Disp: 1 Inhaler, Rfl: 0    amoxicillin-clavulanate 875-125mg (AUGMENTIN) 875-125 mg per tablet, Take 1 tablet by mouth 2 (two) times daily. (Patient not taking: Reported on 11/8/2020), Disp: 14 tablet, Rfl: 0    loratadine (CLARITIN) 10 mg tablet, Take 1 tablet (10 mg total) by mouth once daily., Disp: 30 tablet, Rfl: 2    promethazine-dextromethorphan (PROMETHAZINE-DM) 6.25-15 mg/5 mL Syrp, Take one tsp po q 6 hrs prn cough (Patient not taking: Reported on 11/5/2022), Disp: 180 mL, Rfl: 0    sertraline (ZOLOFT) 100 MG tablet, Take 100 mg by mouth once daily., Disp: , Rfl:      FAMILY HISTORY:  Please see scanned pedigree in patient's chart under media. Patient's ancestry is [place] and [place]. FOB ancestry is [place] and [place]. Consanguinity was denied.     This is the first pregnancy between Nirali and Bernabe. Nirali has one son who is 7y, he has suspected ADHD but is otherwise healthy. Nirali has one maternal sister who has anxiety and depression and has no children. Her mother has a history of thyroid cancer in her 40's, she currently has lupus and ADHD. Nirali's father has a history of illicit drug and alcohol use and is currently incarcerated, she does not know about his health status. Bernabe is an only child he does not have any health problems, nor do his parents.     Additional history negative for multiple miscarriages/stillbirth, developmental delay/intellectual disability, and known genetic disorders. Complete pedigree will be linked to this encounter and can be viewed under the Media tab. The information provided is based on the patient and/or their reproductive partner's recollection of the family history and in the absence of complete medical records. If the family history changes or if more information is obtained, they were asked to contact  us as this may alter the recommendations or impression of the family history.     PAST TESTING  Patient carrier screening: None in referral   Reproductive partner carrier screening: NA  Parental Karyotypes: NA    PREGNANCY TESTING  cfDNA for aneuploidy: Low risk including microdeletions  Diagnostic testing: NA  Fetal karyotype: NA    COUNSELING:   Abnormal serum screen - Down Syndrome (nl cfDNA screen)    Nirali is a 33 y.o. year old No obstetric history on file. A recent maternal serum screen showed an increased risk for Down Syndrome (DS) of 1 in 28. The risk for trisomy 18 and open neural tube defects (NTDs) was not elevated on this screen. The patient denies any known family history of DS, other chromosome/genetic disorders, or serious birth defects in her family or in the family of the father of the baby.    We reviewed the testing options for further evaluation of fetal Down Syndrome and other chromosome abnormalities. Specifically, we discussed that she has already completed further screening with cell-free DNA (cfDNA) testing. As a result we still reviewed the benefits, limitations, and test performance characteristics of cfDNA testing. We also discussed that even when cfDNA results are low risk for Down Syndrome, there remains a 2% chance for other fetal chromosome abnormalities. As part of this discussion, genes, chromosomes and Down Syndrome were reviewed with the patient, and she was provided an opportunity to have her questions answered.     Ultrasound as a screening tool for DS was also reviewed with the patient. The sensitivity of prenatal ultrasound for the detection of DS ranges from 50 - 90%; thus, it remains a screening test with limited ability to confirm or exclude DS.     We also discussed the false positive rate which differs between these two screens. The false positive rate for [place] is 1 in 20 (or 5%). For cfDNA screening, the approximate detection rate is 99% for Trisomy 21, 98% for Trisomy  18, 91% for Trisomy 13, and 90% for Valdes Syndrome. The false positive rates are low ranging from 1/1000 to 2/1000.    We also discussed genetic amniocentesis as the diagnostic prenatal testing option for detection of DS. The risks, benefits, and test performance characteristics of amniocentesis were reviewed. A risk of 1 in 900 for pregnancy loss post-amniocentesis was also reviewed.     Risks and benefits of testing were discussed today. Including the options for pregnancy continuation vs. termination. This was discussed briefly for the purposes of anticipatory guidance and were discussed non-directively.     After discussion, the patient stated that she felt reassured by this information.      DISCUSSION & IMPRESSION:  Nirali is a 33 y.o. female with a positive serum screen for Down Syndrome followed by a low risk cfDNA screen. She was confused by this and wanted to know why the serum screen would be positive. We reviewed the above information and she did ask questions about amniocentesis as an option. She did not think that she wanted to proceed with one after our conversation. We reviewed that this may be revisited if any anomalies are noted on her ultrasound.     TESTING OPTIONS  Diagnostic Testing: Amniocentesis   Carrier Screening: NA  Pregnancy Options: NA  Recurrence Risk:NA  Procedures/labs DECLINED today: Amniocentesis    Nirali stated that she had her questions answered today and did not have remaining concerns.     We reviewed Nirali's medical and family history. We discussed basics of genetics and the difference between screening and diagnostic testing. Nirali was understanding of the information discussed in clinic and all questions were answered.     Please see Dr. Eubanks's upcoming note for detailed information regarding ultrasound findings, plan of care and diagnostic considerations.    RECOMMENDATIONS:  No additional genetic testing recommendations    Samantha Kothari MS, [facility] [place] [facility]  Licensed, Certified  Genetic Counselor  [facility]

## 2023-04-05 ENCOUNTER — PATIENT MESSAGE (OUTPATIENT)
Dept: MATERNAL FETAL MEDICINE | Facility: CLINIC | Age: 33
End: 2023-04-05
Payer: MEDICAID

## 2023-04-06 ENCOUNTER — LAB VISIT (OUTPATIENT)
Dept: LAB | Facility: OTHER | Age: 33
End: 2023-04-06
Attending: OBSTETRICS & GYNECOLOGY
Payer: MEDICAID

## 2023-04-06 ENCOUNTER — PROCEDURE VISIT (OUTPATIENT)
Dept: MATERNAL FETAL MEDICINE | Facility: CLINIC | Age: 33
End: 2023-04-06
Payer: MEDICAID

## 2023-04-06 ENCOUNTER — OFFICE VISIT (OUTPATIENT)
Dept: MATERNAL FETAL MEDICINE | Facility: CLINIC | Age: 33
End: 2023-04-06
Payer: MEDICAID

## 2023-04-06 VITALS
BODY MASS INDEX: 35.39 KG/M2 | WEIGHT: 199.75 LBS | DIASTOLIC BLOOD PRESSURE: 79 MMHG | HEIGHT: 63 IN | SYSTOLIC BLOOD PRESSURE: 133 MMHG

## 2023-04-06 DIAGNOSIS — Z13.228 CYSTIC FIBROSIS SCREENING: Primary | ICD-10-CM

## 2023-04-06 DIAGNOSIS — O28.5 MATERNAL SERUM SCREEN POSITIVE FOR TRISOMY 21: ICD-10-CM

## 2023-04-06 DIAGNOSIS — Z36.89 ENCOUNTER FOR FETAL ANATOMIC SURVEY: ICD-10-CM

## 2023-04-06 DIAGNOSIS — Z13.228 CYSTIC FIBROSIS SCREENING: ICD-10-CM

## 2023-04-06 DIAGNOSIS — Z36.2 ENCOUNTER FOR FOLLOW-UP ULTRASOUND OF FETAL ANATOMY: ICD-10-CM

## 2023-04-06 PROCEDURE — 3008F BODY MASS INDEX DOCD: CPT | Mod: CPTII,,, | Performed by: OBSTETRICS & GYNECOLOGY

## 2023-04-06 PROCEDURE — 99999 PR PBB SHADOW E&M-EST. PATIENT-LVL II: ICD-10-PCS | Mod: PBBFAC,,, | Performed by: OBSTETRICS & GYNECOLOGY

## 2023-04-06 PROCEDURE — 3008F PR BODY MASS INDEX (BMI) DOCUMENTED: ICD-10-PCS | Mod: CPTII,,, | Performed by: OBSTETRICS & GYNECOLOGY

## 2023-04-06 PROCEDURE — 3078F PR MOST RECENT DIASTOLIC BLOOD PRESSURE < 80 MM HG: ICD-10-PCS | Mod: CPTII,,, | Performed by: OBSTETRICS & GYNECOLOGY

## 2023-04-06 PROCEDURE — 99205 PR OFFICE/OUTPT VISIT, NEW, LEVL V, 60-74 MIN: ICD-10-PCS | Mod: S$PBB,TH,25, | Performed by: OBSTETRICS & GYNECOLOGY

## 2023-04-06 PROCEDURE — 36415 COLL VENOUS BLD VENIPUNCTURE: CPT | Performed by: OBSTETRICS & GYNECOLOGY

## 2023-04-06 PROCEDURE — 76811 OB US DETAILED SNGL FETUS: CPT | Mod: PBBFAC | Performed by: OBSTETRICS & GYNECOLOGY

## 2023-04-06 PROCEDURE — 76811 US MFM PROCEDURE (VIEWPOINT): ICD-10-PCS | Mod: 26,S$PBB,, | Performed by: OBSTETRICS & GYNECOLOGY

## 2023-04-06 PROCEDURE — 81220 CFTR GENE COM VARIANTS: CPT | Performed by: OBSTETRICS & GYNECOLOGY

## 2023-04-06 PROCEDURE — 3075F PR MOST RECENT SYSTOLIC BLOOD PRESS GE 130-139MM HG: ICD-10-PCS | Mod: CPTII,,, | Performed by: OBSTETRICS & GYNECOLOGY

## 2023-04-06 PROCEDURE — 3075F SYST BP GE 130 - 139MM HG: CPT | Mod: CPTII,,, | Performed by: OBSTETRICS & GYNECOLOGY

## 2023-04-06 PROCEDURE — 99999 PR PBB SHADOW E&M-EST. PATIENT-LVL II: CPT | Mod: PBBFAC,,, | Performed by: OBSTETRICS & GYNECOLOGY

## 2023-04-06 PROCEDURE — 99205 OFFICE O/P NEW HI 60 MIN: CPT | Mod: S$PBB,TH,25, | Performed by: OBSTETRICS & GYNECOLOGY

## 2023-04-06 PROCEDURE — 3078F DIAST BP <80 MM HG: CPT | Mod: CPTII,,, | Performed by: OBSTETRICS & GYNECOLOGY

## 2023-04-06 PROCEDURE — 99212 OFFICE O/P EST SF 10 MIN: CPT | Mod: PBBFAC,25,TH | Performed by: OBSTETRICS & GYNECOLOGY

## 2023-04-06 RX ORDER — ASPIRIN 81 MG/1
81 TABLET ORAL DAILY
COMMUNITY

## 2023-04-06 NOTE — PROGRESS NOTES
"MATERNAL-FETAL MEDICINE   CONSULT NOTE    Provider requesting consultation: Harrison Edwards MD     SUBJECTIVE:     Ms. Nirali Chavez is a  who is seen in consultation by MFM for evaluation and management of:  Positive Quad DSR; Negative M21   Echogenic Fetal Bowel.          Medication List with Changes/Refills   Current Medications    ADDERALL XR 10 MG 24 HR CAPSULE    Take by mouth daily as needed.    ADDERALL XR 30 MG 24 HR CAPSULE    Take by mouth daily as needed.    ALBUTEROL 90 MCG/ACTUATION INHALER    Inhale 1-2 puffs into the lungs every 6 (six) hours as needed for Wheezing. Rescue    AMOXICILLIN-CLAVULANATE 875-125MG (AUGMENTIN) 875-125 MG PER TABLET    Take 1 tablet by mouth 2 (two) times daily.    LORATADINE (CLARITIN) 10 MG TABLET    Take 1 tablet (10 mg total) by mouth once daily.    PROMETHAZINE-DEXTROMETHORPHAN (PROMETHAZINE-DM) 6.25-15 MG/5 ML SYRP    Take one tsp po q 6 hrs prn cough    SERTRALINE (ZOLOFT) 100 MG TABLET    Take 100 mg by mouth once daily.       Review of patient's allergies indicates:  No Known Allergies    PMH:  Past Medical History:   Diagnosis Date    ADHD (attention deficit hyperactivity disorder)     Thyroid condition        PObHx:  OB History   No obstetric history on file.       PSH:  Past Surgical History:   Procedure Laterality Date    TONSILLECTOMY         Family history:family history includes ALS in her maternal grandfather; Cancer in her mother.    Social history: Currently vapes.  Denies drug or alcohol exposure during pregnancy.      Genetic history:  The patient denies any inherited genetic diseases or birth defects in herself or her partner's personal history or family.    Objective:   /79 (BP Location: Left arm, Patient Position: Sitting, BP Method: X-Large (Automatic))   Ht 5' 3" (1.6 m)   Wt 90.6 kg (199 lb 11.8 oz)   LMP 10/31/2022 (Exact Date)   BMI 35.38 kg/m²      Physical Exam    Ultrasound performed. See viewpoint for full ultrasound report.  A " detailed fetal anatomic ultrasound examination was performed today due to the following high risk indication: echogenic fetal bowel, Quad + DSR, cfDNA screening : negative.   No fetal structural abnormalities are identified today, and fetal size is appropriate for EGA.   Views of the fetal face were suboptimal .   Echogenic fetal bowel is seen.   Biometric parameters lag by approximately one week on today's examination.   Cervical length by TA scanning is normal.   Placental location is anterior without evidence of previa.     Significant labs/imaging:  Pos Quad DSR, negative NIPT     ASSESSMENT/PLAN:     33 y.o. No obstetric history on file. female with IUP at Unknown     Abnormal serum screen - Down Syndrome, Normal cfDNA screening     A recent maternal serum screen showed an increased risk for Down Syndrome (DS) of 1 in 28. The risk for trisomy 18 and open neural tube defects (NTDs) was not elevated on this screen. The patient denies any known family history of DS, other chromosome/genetic disorders, or serious birth defects in her family or in the family of the father of the baby.  She subsequently underwent cfDNA screening and that result returned as negative.     We reviewed the testing options for evaluation of fetal Down Syndrome and other chromosome abnormalities. Specifically, we discussed screening with cell-free DNA (cfDNA) testing and reviewed the benefits, limitations, and test performance characteristics of cfDNA testing. We also discussed that even when cfDNA results are low risk for Down Syndrome, there remains a 2% chance for other fetal chromosome abnormalities. As part of this discussion, genes, chromosomes and Down Syndrome were reviewed with the patient, and she was provided an opportunity to have her questions answered. The patient previously pursued cfDNA screening and those results were negative.     Ultrasound as a screening tool for DS was also reviewed with the patient. The sensitivity of  prenatal ultrasound for the detection of DS ranges from 50 - 90%; thus, it remains a screening test with limited ability to confirm or exclude DS. However, with a normal fetal anatomic survey ultrasound exam, the a priori risk for Down Syndrome can be revised (typically by 0.5X the a priori risk).  The patient's exam today without evidence of major structural fetal malformation.  However, echogenic fetal bowel was seen and the biometry lagged by approximately one week behind dates.  See below for EFB counseling.  The potential implications of these findings were reviewed, and the patient was cautioned these findings could be indicative of an increased risk for aneuploidy or placental dysfunction.      We also discussed genetic amniocentesis as the diagnostic prenatal testing option for detection of DS. The risks, benefits, and test performance characteristics of amniocentesis were reviewed. A risk of 1 in 900 for pregnancy loss post-amniocentesis was also reviewed.     Risks and benefits of testing were discussed today. Including the options for pregnancy continuation vs. termination. This was discussed briefly for the purposes of anticipatory guidance and were discussed non-directively.  The patient has an appointment 4/19/23 for termination in [place]. She has conflicted feelings and feels as though she struggles already with a young child who, like she, has ADHD.  She wants to talk with her fiance and may want a pregnancy termination without amniocentesis or may want and amniocentesis and then to consider a termination if that is abnormal.  She is Z[employer]astrian and that complicates her decision making process as well.  She has many complicating variables impacting her decision re additional testing vs termination vs continuation of the pregnancy.    Echogenic Fetal Bowel:   The association between fetal echogenic bowel and the following conditions was discussed:   1. Down Syndrome   2. Fetal CMV infection   3.  Cystic fibrosis (CF)   4. IUGR   5. Bowel obstruction       Options for further evaluation include the followin. Maternal CMV serologies   2. Maternal CF carrier screening (if positive, add paternal CF carrier screening)   3. F/U ultrasound exams to assess for IUGR and bowel obstruction   4. Amniocentesis for definitive diagnosis of chromosome abnormalities, CMV infection, and, if CF mutations identified in both parents, cystic Fibrosis mutation analysis.      The patient appears conflicted about further testing.  There were no other stigmata of fetal infection and given lag in biometry and initial positive Quad, feel most likely considerations are aneuploidy or early developing FGR.  The patient consented and desires CF screening.  We will reassess growth in 4 weeks if she does not terminate the pregnancy and she will discuss the option of termination vs amniocentesis with her fiance.       FOLLOW UP:   CF carrier screening was performed today.  Anticipate this will be normal; if positive, partner will need screening as well.   The patient desires a return MFM MD visit with me and a repeat US for completion of anatomy and assessment of growth in 4 weeks.   The patient will reach out next week if she desires to pursue amniocentesis and does not have a termination on 23 in [place].   She remains conflicted regarding additional testing and desires regarding pregnancy management, but expects she will feel more clarification after speaking with her fiance.   Georgiana Leo MD      60 minutes of total time spent on the encounter, which includes face to face time and non-face to face time preparing to see the patient (eg, review of tests), obtaining and/or reviewing separately obtained history, documenting clinical information in the electronic or other health record, independently interpreting results (not separately reported) and communicating results to the patient/family/caregiver, or care  coordination (not separately reported).      Georgiana Leo  Maternal-Fetal Medicine    Electronically Signed by Georgiana Leo April 6, 2023

## 2023-04-11 LAB — CFTR MUT ANL BLD/T: NORMAL

## 2023-04-13 ENCOUNTER — PATIENT MESSAGE (OUTPATIENT)
Dept: MATERNAL FETAL MEDICINE | Facility: CLINIC | Age: 33
End: 2023-04-13
Payer: MEDICAID

## 2023-04-27 ENCOUNTER — HOSPITAL ENCOUNTER (EMERGENCY)
Facility: HOSPITAL | Age: 33
Discharge: HOME | End: 2023-04-27
Attending: EMERGENCY MEDICINE
Payer: MEDICAID

## 2023-04-27 VITALS
WEIGHT: 190 LBS | BODY MASS INDEX: 33.66 KG/M2 | DIASTOLIC BLOOD PRESSURE: 89 MMHG | OXYGEN SATURATION: 98 % | TEMPERATURE: 98 F | HEART RATE: 79 BPM | RESPIRATION RATE: 20 BRPM | SYSTOLIC BLOOD PRESSURE: 150 MMHG

## 2023-04-27 DIAGNOSIS — N39.0 LOWER URINARY TRACT INFECTIOUS DISEASE: Primary | ICD-10-CM

## 2023-04-27 LAB
ALBUMIN SERPL BCP-MCNC: 3.5 G/DL (ref 3.5–5.2)
ALP SERPL-CCNC: 43 U/L (ref 55–135)
ALT SERPL W/O P-5'-P-CCNC: 18 U/L (ref 10–44)
ANION GAP SERPL CALC-SCNC: 11 MMOL/L (ref 8–16)
AST SERPL-CCNC: 16 U/L (ref 10–40)
B-HCG UR QL: POSITIVE
BACTERIA #/AREA URNS HPF: ABNORMAL /HPF
BASOPHILS # BLD AUTO: 0.02 K/UL (ref 0–0.2)
BASOPHILS NFR BLD: 0.2 % (ref 0–1.9)
BILIRUB SERPL-MCNC: 0.8 MG/DL (ref 0.1–1)
BILIRUB UR QL STRIP: NEGATIVE
BUN SERPL-MCNC: 6 MG/DL (ref 6–20)
CALCIUM SERPL-MCNC: 9 MG/DL (ref 8.7–10.5)
CHLORIDE SERPL-SCNC: 108 MMOL/L (ref 95–110)
CLARITY UR: ABNORMAL
CO2 SERPL-SCNC: 17 MMOL/L (ref 23–29)
COLOR UR: YELLOW
CREAT SERPL-MCNC: 0.5 MG/DL (ref 0.5–1.4)
CTP QC/QA: YES
DIFFERENTIAL METHOD: ABNORMAL
EOSINOPHIL # BLD AUTO: 0 K/UL (ref 0–0.5)
EOSINOPHIL NFR BLD: 0.1 % (ref 0–8)
ERYTHROCYTE [DISTWIDTH] IN BLOOD BY AUTOMATED COUNT: 13.1 % (ref 11.5–14.5)
EST. GFR  (NO RACE VARIABLE): >60 ML/MIN/1.73 M^2
GLUCOSE SERPL-MCNC: 91 MG/DL (ref 70–110)
GLUCOSE UR QL STRIP: NEGATIVE
HCT VFR BLD AUTO: 35.2 % (ref 37–48.5)
HGB BLD-MCNC: 12.4 G/DL (ref 12–16)
HGB UR QL STRIP: NEGATIVE
HYALINE CASTS #/AREA URNS LPF: 74 /LPF
IMM GRANULOCYTES # BLD AUTO: 0.05 K/UL (ref 0–0.04)
IMM GRANULOCYTES NFR BLD AUTO: 0.4 % (ref 0–0.5)
INFLUENZA A, MOLECULAR: NEGATIVE
INFLUENZA B, MOLECULAR: NEGATIVE
KETONES UR QL STRIP: ABNORMAL
LEUKOCYTE ESTERASE UR QL STRIP: ABNORMAL
LYMPHOCYTES # BLD AUTO: 1.8 K/UL (ref 1–4.8)
LYMPHOCYTES NFR BLD: 15.8 % (ref 18–48)
MAGNESIUM SERPL-MCNC: 1.6 MG/DL (ref 1.6–2.6)
MCH RBC QN AUTO: 29.5 PG (ref 27–31)
MCHC RBC AUTO-ENTMCNC: 35.2 G/DL (ref 32–36)
MCV RBC AUTO: 84 FL (ref 82–98)
MICROSCOPIC COMMENT: ABNORMAL
MONOCYTES # BLD AUTO: 0.4 K/UL (ref 0.3–1)
MONOCYTES NFR BLD: 3.7 % (ref 4–15)
NEUTROPHILS # BLD AUTO: 8.9 K/UL (ref 1.8–7.7)
NEUTROPHILS NFR BLD: 79.8 % (ref 38–73)
NITRITE UR QL STRIP: NEGATIVE
NRBC BLD-RTO: 0 /100 WBC
PH UR STRIP: 7 [PH] (ref 5–8)
PLATELET # BLD AUTO: 303 K/UL (ref 150–450)
PMV BLD AUTO: 10.7 FL (ref 9.2–12.9)
POTASSIUM SERPL-SCNC: 2.9 MMOL/L (ref 3.5–5.1)
PROT SERPL-MCNC: 6.7 G/DL (ref 6–8.4)
PROT UR QL STRIP: ABNORMAL
RBC # BLD AUTO: 4.2 M/UL (ref 4–5.4)
RBC #/AREA URNS HPF: 3 /HPF (ref 0–4)
SARS-COV-2 RDRP RESP QL NAA+PROBE: NEGATIVE
SODIUM SERPL-SCNC: 136 MMOL/L (ref 136–145)
SP GR UR STRIP: 1.02 (ref 1–1.03)
SPECIMEN SOURCE: NORMAL
SQUAMOUS #/AREA URNS HPF: 13 /HPF
URN SPEC COLLECT METH UR: ABNORMAL
UROBILINOGEN UR STRIP-ACNC: ABNORMAL EU/DL
WBC # BLD AUTO: 11.17 K/UL (ref 3.9–12.7)
WBC #/AREA URNS HPF: 30 /HPF (ref 0–5)

## 2023-04-27 PROCEDURE — 25000003 PHARM REV CODE 250

## 2023-04-27 PROCEDURE — 83735 ASSAY OF MAGNESIUM: CPT

## 2023-04-27 PROCEDURE — 81001 URINALYSIS AUTO W/SCOPE: CPT

## 2023-04-27 PROCEDURE — 87502 INFLUENZA DNA AMP PROBE: CPT

## 2023-04-27 PROCEDURE — U0002 COVID-19 LAB TEST NON-CDC: HCPCS

## 2023-04-27 PROCEDURE — 85025 COMPLETE CBC W/AUTO DIFF WBC: CPT

## 2023-04-27 PROCEDURE — 63600175 PHARM REV CODE 636 W HCPCS

## 2023-04-27 PROCEDURE — 87086 URINE CULTURE/COLONY COUNT: CPT

## 2023-04-27 PROCEDURE — 81025 URINE PREGNANCY TEST: CPT

## 2023-04-27 PROCEDURE — 96375 TX/PRO/DX INJ NEW DRUG ADDON: CPT

## 2023-04-27 PROCEDURE — 80053 COMPREHEN METABOLIC PANEL: CPT

## 2023-04-27 PROCEDURE — 96365 THER/PROPH/DIAG IV INF INIT: CPT

## 2023-04-27 PROCEDURE — 99284 EMERGENCY DEPT VISIT MOD MDM: CPT | Mod: 25

## 2023-04-27 RX ORDER — CEFTRIAXONE 1 G/1
1 INJECTION, POWDER, FOR SOLUTION INTRAMUSCULAR; INTRAVENOUS
Status: DISCONTINUED | OUTPATIENT
Start: 2023-04-27 | End: 2023-04-27

## 2023-04-27 RX ORDER — POTASSIUM CHLORIDE 20 MEQ/1
20 TABLET, EXTENDED RELEASE ORAL ONCE
Status: COMPLETED | OUTPATIENT
Start: 2023-04-27 | End: 2023-04-27

## 2023-04-27 RX ORDER — PROCHLORPERAZINE EDISYLATE 5 MG/ML
10 INJECTION INTRAMUSCULAR; INTRAVENOUS
Status: COMPLETED | OUTPATIENT
Start: 2023-04-27 | End: 2023-04-27

## 2023-04-27 RX ORDER — CEFUROXIME AXETIL 500 MG/1
500 TABLET ORAL EVERY 12 HOURS
Qty: 20 TABLET | Refills: 0 | Status: SHIPPED | OUTPATIENT
Start: 2023-04-27 | End: 2023-05-04

## 2023-04-27 RX ADMIN — PROCHLORPERAZINE EDISYLATE 10 MG: 5 INJECTION INTRAMUSCULAR; INTRAVENOUS at 12:04

## 2023-04-27 RX ADMIN — CEFTRIAXONE 1 G: 1 INJECTION, POWDER, FOR SOLUTION INTRAMUSCULAR; INTRAVENOUS at 02:04

## 2023-04-27 RX ADMIN — POTASSIUM CHLORIDE 20 MEQ: 1500 TABLET, EXTENDED RELEASE ORAL at 02:04

## 2023-04-27 NOTE — ED PROVIDER NOTES
Encounter Date: 4/27/2023       History     Chief Complaint   Patient presents with    Vomiting    Diarrhea    Mental Health Problem     33-year-old female presents with nausea vomiting and diarrhea.  Patient states she is 20 weeks pregnant.  Triage nurse noted that the patient stated she was suffering from depression.  I questioned the patient regarding history of depression.  Patient states that she was on Zoloft previously before becoming pregnant.  She has been off of it for the 1st trimester and just restarted it 2 weeks ago.  She has seen her psychiatrist.  And was told it would take 3-4 weeks to take effect.  She has an appointment next Wednesday with psychiatry.  Denies any thoughts of harming herself or anyone else.    The history is provided by the patient.   Review of patient's allergies indicates:  No Known Allergies  Past Medical History:   Diagnosis Date    ADHD (attention deficit hyperactivity disorder)     Depression     Thyroid condition      Past Surgical History:   Procedure Laterality Date    TONSILLECTOMY  1996     Family History   Problem Relation Age of Onset    ALS Maternal Grandfather     Cancer Mother      Social History     Tobacco Use    Smoking status: Every Day     Packs/day: 0.50     Types: Cigarettes, Vaping with nicotine   Substance Use Topics    Alcohol use: Not Currently     Comment: socially    Drug use: No     Review of Systems   Constitutional:  Negative for appetite change and fatigue.   HENT:  Negative for postnasal drip, rhinorrhea, sore throat and trouble swallowing.    Respiratory:  Negative for shortness of breath.    Cardiovascular:  Negative for chest pain and leg swelling.   Gastrointestinal:  Positive for diarrhea and nausea. Negative for abdominal distention.   Genitourinary:  Negative for difficulty urinating, dyspareunia, dysuria and hematuria.   Neurological:  Negative for dizziness and light-headedness.     Physical Exam     Initial Vitals [04/27/23 1144]   BP  Pulse Resp Temp SpO2   (!) 141/78 81 18 98.7 °F (37.1 °C) 100 %      MAP       --         Physical Exam    Constitutional: She appears well-developed and well-nourished.   HENT:   Head: Normocephalic.   Nose: Nose normal.   Mouth/Throat: Oropharynx is clear and moist. No oropharyngeal exudate.   Eyes: Conjunctivae are normal. Pupils are equal, round, and reactive to light. Right eye exhibits no discharge. Left eye exhibits no discharge.   Neck:   Normal range of motion.  Cardiovascular:  Normal rate, regular rhythm and normal heart sounds.           Pulmonary/Chest: Breath sounds normal. No respiratory distress.   Abdominal: Abdomen is soft. Bowel sounds are normal. She exhibits no distension. There is no abdominal tenderness.   Musculoskeletal:         General: Normal range of motion.      Cervical back: Normal range of motion.     Lymphadenopathy:     She has no cervical adenopathy.   Neurological: She is alert and oriented to person, place, and time.   Skin: Skin is warm and dry. Capillary refill takes less than 2 seconds.   Psychiatric: She has a normal mood and affect. Her behavior is normal. Judgment and thought content normal.       ED Course   Procedures  Labs Reviewed   CBC W/ AUTO DIFFERENTIAL - Abnormal; Notable for the following components:       Result Value    Hematocrit 35.2 (*)     Gran # (ANC) 8.9 (*)     Immature Grans (Abs) 0.05 (*)     Gran % 79.8 (*)     Lymph % 15.8 (*)     Mono % 3.7 (*)     All other components within normal limits   COMPREHENSIVE METABOLIC PANEL - Abnormal; Notable for the following components:    Potassium 2.9 (*)     CO2 17 (*)     Alkaline Phosphatase 43 (*)     All other components within normal limits    Narrative:     Potassium critical result(s) repeated. Called and verbal readback   obtained from [facility], RN. by NICOLASA 04/27/2023 14:25   URINALYSIS, REFLEX TO URINE CULTURE - Abnormal; Notable for the following components:    Appearance, UA Cloudy (*)      Protein, UA 1+ (*)     Ketones, UA 3+ (*)     Urobilinogen, UA 2.0-3.0 (*)     Leukocytes, UA 3+ (*)     All other components within normal limits    Narrative:     Specimen Source->Urine   URINALYSIS MICROSCOPIC - Abnormal; Notable for the following components:    WBC, UA 30 (*)     Hyaline Casts, UA 74 (*)     All other components within normal limits    Narrative:     Specimen Source->Urine   POCT URINE PREGNANCY - Abnormal; Notable for the following components:    POC Preg Test, Ur Positive (*)     All other components within normal limits   CULTURE, URINE   MAGNESIUM   SARS-COV-2 RNA AMPLIFICATION, QUAL   INFLUENZA A AND B ANTIGEN    Narrative:     Specimen Source->Nasopharyngeal Swab          Imaging Results    None          Medications   prochlorperazine injection Soln 10 mg (10 mg Intravenous Given 23 1214)   cefTRIAXone (ROCEPHIN) 1 g in dextrose 5 % 100 mL IVPB (ready to mix) (0 g Intravenous Stopped 23 144)   potassium chloride SA CR tablet 20 mEq (20 mEq Oral Given 23 144)     Medical Decision Making:   Initial Assessment:   33-year-old female presents with nausea vomiting and diarrhea.  Patient states she is 20 weeks pregnant.  Patient denies urinary symptoms.  Patient denies signs and symptoms of  labor.  Denies vaginal bleeding.  Denies fever. 33-year-old female presents with nausea vomiting and diarrhea.  Patient states she is 20 weeks pregnant.  Triage nurse noted that the patient stated she was suffering from depression.  I questioned the patient regarding history of depression.  Patient states that she was on Zoloft previously before becoming pregnant.  She has been off of it for the 1st trimester and just restarted it 2 weeks ago.  She has seen her psychiatrist.  And was told it would take 3-4 weeks to take effect.  She has an appointment next Wednesday with psychiatry.  Denies any thoughts of harming herself or anyone else.  Patient states she is receiving routine  prenatal care and she is following up for depression.  History and exam do not warrant a further psychological evaluation.    The history is provided by the patient.     The history is provided by the patient.   Differential Diagnosis:   Electrolyte abnormalities, viral illness, gastroenteritis, dehydration, hypokalemia, urinary tract infection  Clinical Tests:   Lab Tests: Ordered and Reviewed  The following lab test(s) were unremarkable: CBC and CMP  ED Management:  Patient is a 33-year-old ill-appearing female who presents for emergent follow-up nausea vomiting and diarrhea that has been present for 1 week.  Patient is a  she is 20 weeks pregnant with her 2nd child.  Denies fever.  Denies chills.  Patient states her son is also sick with the same symptoms at home.  She has not gone into her OBGYN office or call them.  Lungs are clear.  Physical exam and history are negative for signs and symptoms of  labor.  Patient denies pelvic cramping.  Denies vaginal bleeding.  Reports that she is seeing an OB gyn and maternal fetal medicine.  She has had a healthy pregnancy.  Abdominal exam is negative for distention, guarding, rebound tenderness, or mass.  Patient is afebrile and admit to the emergency room blood pressure is 150/89, temperature is 98.4°, pulse is 79, and oxygen saturation is 98%, respirations are 16.  Fetal heart tones are in the 160s.  Patient given a 1000 cc bolus of normal saline with 10 mg of Compazine.  Nausea is relieved.  CMP reviewed and is consistent with hypokalemia.  Patient given 20 mEq of potassium chloride orally.  CBC is not consistent with an infectious process or anemia.  Urinalysis is consistent with a urinary tract infection.  Patient treated with 1 g Rocephin IV while in the emergency department.  Discussed case with Dr. Coombs.  We will discharge home with Ceftin.  Patient instructed to follow up with OBGYN this week.The likelihood of other entities in the differential is  insufficient to justify any further testing for them.  This was explained to the patient. The patient was advised that persistent or worsening symptoms would require further evaluations. Returned precautions discussed and discharge plan reviewed. Patient verbalizes understanding and when to return the emergency room.                          Clinical Impression:   Final diagnoses:  [N39.0] Lower urinary tract infectious disease (Primary)        ED Disposition Condition    Discharge Stable          ED Prescriptions       Medication Sig Dispense Start Date End Date Auth. Provider    cefUROXime (CEFTIN) 500 MG tablet Take 1 tablet (500 mg total) by mouth every 12 (twelve) hours. for 7 days 20 tablet 4/27/2023 5/4/2023 Viry Diallo NP          Follow-up Information       Follow up With Specialties Details Why Contact Info Additional Information    Harrison Edwards MD Obstetrics, Obstetrics and Gynecology In 1 week  [address]  [place] [facility] [address]  [phone]       [facility] - Emergency Dept Emergency Medicine Go to  As needed, If symptoms worsen [address] [address]  [phone] 1st floor             Viry Diallo NP  04/27/23 1726       Viry Diallo NP  04/29/23 0015

## 2023-04-29 LAB
BACTERIA UR CULT: NORMAL
BACTERIA UR CULT: NORMAL

## 2023-05-03 ENCOUNTER — PATIENT MESSAGE (OUTPATIENT)
Dept: MATERNAL FETAL MEDICINE | Facility: CLINIC | Age: 33
End: 2023-05-03
Payer: MEDICAID

## 2023-05-05 ENCOUNTER — OFFICE VISIT (OUTPATIENT)
Dept: MATERNAL FETAL MEDICINE | Facility: CLINIC | Age: 33
End: 2023-05-05
Payer: MEDICAID

## 2023-05-05 ENCOUNTER — PROCEDURE VISIT (OUTPATIENT)
Dept: MATERNAL FETAL MEDICINE | Facility: CLINIC | Age: 33
End: 2023-05-05
Payer: MEDICAID

## 2023-05-05 VITALS
DIASTOLIC BLOOD PRESSURE: 78 MMHG | WEIGHT: 199.31 LBS | HEIGHT: 63 IN | SYSTOLIC BLOOD PRESSURE: 118 MMHG | BODY MASS INDEX: 35.32 KG/M2

## 2023-05-05 DIAGNOSIS — O28.5 MATERNAL SERUM SCREEN POSITIVE FOR TRISOMY 21: ICD-10-CM

## 2023-05-05 DIAGNOSIS — Z72.0 TOBACCO ABUSE: ICD-10-CM

## 2023-05-05 DIAGNOSIS — Z36.89 ENCOUNTER FOR ULTRASOUND TO ASSESS FETAL GROWTH: Primary | ICD-10-CM

## 2023-05-05 DIAGNOSIS — O28.3 ECHOGENIC BOWEL OF FETUS ON PRENATAL ULTRASOUND: ICD-10-CM

## 2023-05-05 DIAGNOSIS — O28.5 ABNORMAL CHROMOSOMAL AND GENETIC FINDING ON ANTENATAL SCREENING MOTHER: ICD-10-CM

## 2023-05-05 DIAGNOSIS — Z36.2 ENCOUNTER FOR FOLLOW-UP ULTRASOUND OF FETAL ANATOMY: ICD-10-CM

## 2023-05-05 PROCEDURE — 1159F MED LIST DOCD IN RCRD: CPT | Mod: CPTII,,, | Performed by: OBSTETRICS & GYNECOLOGY

## 2023-05-05 PROCEDURE — 3078F PR MOST RECENT DIASTOLIC BLOOD PRESSURE < 80 MM HG: ICD-10-PCS | Mod: CPTII,,, | Performed by: OBSTETRICS & GYNECOLOGY

## 2023-05-05 PROCEDURE — 3008F PR BODY MASS INDEX (BMI) DOCUMENTED: ICD-10-PCS | Mod: CPTII,,, | Performed by: OBSTETRICS & GYNECOLOGY

## 2023-05-05 PROCEDURE — 99215 OFFICE O/P EST HI 40 MIN: CPT | Mod: TH,25,S$PBB, | Performed by: OBSTETRICS & GYNECOLOGY

## 2023-05-05 PROCEDURE — 99999 PR PBB SHADOW E&M-EST. PATIENT-LVL III: ICD-10-PCS | Mod: PBBFAC,,, | Performed by: OBSTETRICS & GYNECOLOGY

## 2023-05-05 PROCEDURE — 99213 OFFICE O/P EST LOW 20 MIN: CPT | Mod: PBBFAC,TH | Performed by: OBSTETRICS & GYNECOLOGY

## 2023-05-05 PROCEDURE — 3074F SYST BP LT 130 MM HG: CPT | Mod: CPTII,,, | Performed by: OBSTETRICS & GYNECOLOGY

## 2023-05-05 PROCEDURE — 76816 US MFM PROCEDURE (VIEWPOINT): ICD-10-PCS | Mod: 26,S$PBB,, | Performed by: OBSTETRICS & GYNECOLOGY

## 2023-05-05 PROCEDURE — 3078F DIAST BP <80 MM HG: CPT | Mod: CPTII,,, | Performed by: OBSTETRICS & GYNECOLOGY

## 2023-05-05 PROCEDURE — 1159F PR MEDICATION LIST DOCUMENTED IN MEDICAL RECORD: ICD-10-PCS | Mod: CPTII,,, | Performed by: OBSTETRICS & GYNECOLOGY

## 2023-05-05 PROCEDURE — 3008F BODY MASS INDEX DOCD: CPT | Mod: CPTII,,, | Performed by: OBSTETRICS & GYNECOLOGY

## 2023-05-05 PROCEDURE — 3074F PR MOST RECENT SYSTOLIC BLOOD PRESSURE < 130 MM HG: ICD-10-PCS | Mod: CPTII,,, | Performed by: OBSTETRICS & GYNECOLOGY

## 2023-05-05 PROCEDURE — 76816 OB US FOLLOW-UP PER FETUS: CPT | Mod: PBBFAC | Performed by: OBSTETRICS & GYNECOLOGY

## 2023-05-05 PROCEDURE — 99999 PR PBB SHADOW E&M-EST. PATIENT-LVL III: CPT | Mod: PBBFAC,,, | Performed by: OBSTETRICS & GYNECOLOGY

## 2023-05-05 PROCEDURE — 99215 PR OFFICE/OUTPT VISIT, EST, LEVL V, 40-54 MIN: ICD-10-PCS | Mod: TH,25,S$PBB, | Performed by: OBSTETRICS & GYNECOLOGY

## 2023-05-05 NOTE — PROGRESS NOTES
Maternal Fetal Medicine follow up consult    SUBJECTIVE:     Nirali Chavez is a 33 y.o.  female with IUP at 22w4d  who is seen in follow up consultation by M.  Pregnancy complications include:   Problem   Abnormal Chromosomal and Genetic Finding On  Screening Mother   Echogenic Bowel of Fetus On Prenatal Ultrasound   Tobacco Abuse       Previous notes reviewed.   No changes to medical, surgical, family, social, or obstetric history.    Interval history since last M visit: no changes or complaints.    Medications:  PNV  ASA     OBJECTIVE:     Blood Pressure: 118/78    Ultrasound performed. See viewpoint for full ultrasound report.  The fetal anatomic survey was completed today, and no fetal structural abnormalities were identified of the structures imaged today.   The AFV is normal. Fetal growth is appropriate and the fetus is technically AGA, although measures low normal with EFW 416g in the 11th%.     ASSESSMENT/PLAN:     33 y.o.  female with IUP at 22w4d     Problems addressed at today's visit:  Abnormal chromosomal and genetic finding on  screening mother  Serum screen 1:28 DSR  cfDNA negative    See previous MFM notes for full consultation.  We again reviewed the ongoing risk for aneuploidy based on previous evaluations and today's ultrasound findings.  The patient again declines amniocentesis.     Echogenic bowel of fetus on prenatal ultrasound  See [facility] for full consultation.  Echogenic bowel improved on today's images.   The patient had negative cystic fibrosis carrier screening. We reviewed the residual risk of CF.   She reports that she had COVID earlier in pregnancy but denies other febrile illnesses. No history concerning for CMV infection. We reviewed the limitations of CMV testing on maternal serum.     Tobacco abuse  Patient counseled on cessation of tobacco use and avoidance of second hand smoke inhalation for duration of the pregnancy and postpartum  period secondary to the associated fetal/ risks that include the following: spontaneous , placental abruption, low birth weight, oligohydramnios, non-reassuring fetal status, and sudden infant death syndrome (SIDS).    We again discussed the risk of evolving fetal growth restriction, as the fetus barely measures AGA on ultrasound today. Advised the patient to abstain from tobacco use if possible. Discussed potential underlying etiologies of early FGR. Will schedule the patient for growth assessment and MFM MD visit in 4 weeks.     Please see original MFM consultation for full details regarding management recommendations of these and other obstetric co-morbidities    Today I have spent 45 minutes in the care of the patient. This includes face to face time and non-face to face time preparing to see the patient (eg, review of tests), obtaining and/or reviewing separately obtained history, documenting clinical information in the electronic or other health record, independently interpreting results and communicating results to the patient/family/caregiver, or care coordination.     Corrine Loja MD  Maternal Fetal Medicine

## 2023-05-05 NOTE — ASSESSMENT & PLAN NOTE
Serum screen 1:28 DSR  cfDNA negative    See previous MFM notes for full consultation.  We again reviewed the ongoing risk for aneuploidy based on previous evaluations and today's ultrasound findings.  The patient again declines amniocentesis.

## 2023-05-05 NOTE — ASSESSMENT & PLAN NOTE
See previous [facility] notes for full consultation.  Echogenic bowel improved on today's images.   The patient had negative cystic fibrosis carrier screening. We reviewed the residual risk of CF.   She reports that she had COVID earlier in pregnancy but denies other febrile illnesses. No history concerning for CMV infection. We reviewed the limitations of CMV testing on maternal serum.

## 2023-06-02 ENCOUNTER — PROCEDURE VISIT (OUTPATIENT)
Dept: MATERNAL FETAL MEDICINE | Facility: CLINIC | Age: 33
End: 2023-06-02
Payer: MEDICAID

## 2023-06-02 ENCOUNTER — OFFICE VISIT (OUTPATIENT)
Dept: MATERNAL FETAL MEDICINE | Facility: CLINIC | Age: 33
End: 2023-06-02
Attending: OBSTETRICS & GYNECOLOGY
Payer: MEDICAID

## 2023-06-02 ENCOUNTER — HOSPITAL ENCOUNTER (EMERGENCY)
Facility: OTHER | Age: 33
Discharge: HOME | End: 2023-06-02
Attending: OBSTETRICS & GYNECOLOGY
Payer: MEDICAID

## 2023-06-02 VITALS
TEMPERATURE: 98 F | DIASTOLIC BLOOD PRESSURE: 81 MMHG | SYSTOLIC BLOOD PRESSURE: 136 MMHG | OXYGEN SATURATION: 96 % | RESPIRATION RATE: 16 BRPM | HEART RATE: 85 BPM

## 2023-06-02 VITALS — WEIGHT: 201.5 LBS | BODY MASS INDEX: 35.69 KG/M2 | SYSTOLIC BLOOD PRESSURE: 142 MMHG | DIASTOLIC BLOOD PRESSURE: 84 MMHG

## 2023-06-02 DIAGNOSIS — O99.332 MATERNAL TOBACCO USE IN SECOND TRIMESTER: ICD-10-CM

## 2023-06-02 DIAGNOSIS — R03.0 ELEVATED BLOOD PRESSURE READING: Primary | ICD-10-CM

## 2023-06-02 DIAGNOSIS — Z36.89 ENCOUNTER FOR ULTRASOUND TO ASSESS FETAL GROWTH: Primary | ICD-10-CM

## 2023-06-02 DIAGNOSIS — O28.9 ABNORMAL FINDING ON ANTENATAL SCREEN: ICD-10-CM

## 2023-06-02 DIAGNOSIS — Z3A.26 26 WEEKS GESTATION OF PREGNANCY: ICD-10-CM

## 2023-06-02 DIAGNOSIS — Z36.89 ENCOUNTER FOR ULTRASOUND TO ASSESS FETAL GROWTH: ICD-10-CM

## 2023-06-02 DIAGNOSIS — O35.DXX0 ECHOGENIC FOCUS OF BOWEL OF FETUS AFFECTING ANTEPARTUM CARE OF MOTHER, SINGLE OR UNSPECIFIED FETUS: Primary | ICD-10-CM

## 2023-06-02 DIAGNOSIS — O16.9 ELEVATED BLOOD PRESSURE AFFECTING PREGNANCY, ANTEPARTUM: ICD-10-CM

## 2023-06-02 DIAGNOSIS — Z36.2 ANTENATAL SCREENING BASED ON AMNIOCENTESIS: ICD-10-CM

## 2023-06-02 DIAGNOSIS — Z36.4 ULTRASOUND FOR ANTENATAL SCREENING FOR FETAL GROWTH RESTRICTION: ICD-10-CM

## 2023-06-02 LAB
ALBUMIN SERPL BCP-MCNC: 2.8 G/DL (ref 3.5–5.2)
ALP SERPL-CCNC: 56 U/L (ref 55–135)
ALT SERPL W/O P-5'-P-CCNC: 9 U/L (ref 10–44)
ANION GAP SERPL CALC-SCNC: 8 MMOL/L (ref 8–16)
AST SERPL-CCNC: 9 U/L (ref 10–40)
BASOPHILS # BLD AUTO: 0.02 K/UL (ref 0–0.2)
BASOPHILS NFR BLD: 0.2 % (ref 0–1.9)
BILIRUB SERPL-MCNC: 0.1 MG/DL (ref 0.1–1)
BUN SERPL-MCNC: 5 MG/DL (ref 6–20)
CALCIUM SERPL-MCNC: 9.1 MG/DL (ref 8.7–10.5)
CHLORIDE SERPL-SCNC: 110 MMOL/L (ref 95–110)
CO2 SERPL-SCNC: 22 MMOL/L (ref 23–29)
CREAT SERPL-MCNC: 0.7 MG/DL (ref 0.5–1.4)
CREAT UR-MCNC: 122 MG/DL (ref 15–325)
DIFFERENTIAL METHOD: ABNORMAL
EOSINOPHIL # BLD AUTO: 0 K/UL (ref 0–0.5)
EOSINOPHIL NFR BLD: 0.4 % (ref 0–8)
ERYTHROCYTE [DISTWIDTH] IN BLOOD BY AUTOMATED COUNT: 13.2 % (ref 11.5–14.5)
EST. GFR  (NO RACE VARIABLE): >60 ML/MIN/1.73 M^2
GLUCOSE SERPL-MCNC: 121 MG/DL (ref 70–110)
HCT VFR BLD AUTO: 34 % (ref 37–48.5)
HGB BLD-MCNC: 11.9 G/DL (ref 12–16)
IMM GRANULOCYTES # BLD AUTO: 0.03 K/UL (ref 0–0.04)
IMM GRANULOCYTES NFR BLD AUTO: 0.3 % (ref 0–0.5)
LYMPHOCYTES # BLD AUTO: 1.8 K/UL (ref 1–4.8)
LYMPHOCYTES NFR BLD: 17.2 % (ref 18–48)
MCH RBC QN AUTO: 30.3 PG (ref 27–31)
MCHC RBC AUTO-ENTMCNC: 35 G/DL (ref 32–36)
MCV RBC AUTO: 87 FL (ref 82–98)
MONOCYTES # BLD AUTO: 0.4 K/UL (ref 0.3–1)
MONOCYTES NFR BLD: 4 % (ref 4–15)
NEUTROPHILS # BLD AUTO: 8.1 K/UL (ref 1.8–7.7)
NEUTROPHILS NFR BLD: 77.9 % (ref 38–73)
NRBC BLD-RTO: 0 /100 WBC
PLATELET # BLD AUTO: 267 K/UL (ref 150–450)
PMV BLD AUTO: 10.6 FL (ref 9.2–12.9)
POTASSIUM SERPL-SCNC: 3.2 MMOL/L (ref 3.5–5.1)
PROT SERPL-MCNC: 5.8 G/DL (ref 6–8.4)
PROT UR-MCNC: 10 MG/DL (ref 0–15)
PROT/CREAT UR: 0.08 MG/G{CREAT} (ref 0–0.2)
RBC # BLD AUTO: 3.93 M/UL (ref 4–5.4)
SODIUM SERPL-SCNC: 140 MMOL/L (ref 136–145)
WBC # BLD AUTO: 10.42 K/UL (ref 3.9–12.7)

## 2023-06-02 PROCEDURE — 3008F BODY MASS INDEX DOCD: CPT | Mod: CPTII,,, | Performed by: OBSTETRICS & GYNECOLOGY

## 2023-06-02 PROCEDURE — 59025 PR FETAL 2N-STRESS TEST: ICD-10-PCS | Mod: 26,,, | Performed by: OBSTETRICS & GYNECOLOGY

## 2023-06-02 PROCEDURE — 99213 OFFICE O/P EST LOW 20 MIN: CPT | Mod: PBBFAC,TH | Performed by: OBSTETRICS & GYNECOLOGY

## 2023-06-02 PROCEDURE — 3077F PR MOST RECENT SYSTOLIC BLOOD PRESSURE >= 140 MM HG: ICD-10-PCS | Mod: CPTII,,, | Performed by: OBSTETRICS & GYNECOLOGY

## 2023-06-02 PROCEDURE — 99284 EMERGENCY DEPT VISIT MOD MDM: CPT | Mod: 25,27

## 2023-06-02 PROCEDURE — 76816 PR  US,PREGNANT UTERUS,F/U,TRANSABD APP: ICD-10-PCS | Mod: 26,S$PBB,, | Performed by: OBSTETRICS & GYNECOLOGY

## 2023-06-02 PROCEDURE — 59025 FETAL NON-STRESS TEST: CPT | Mod: 26,,, | Performed by: OBSTETRICS & GYNECOLOGY

## 2023-06-02 PROCEDURE — 3077F SYST BP >= 140 MM HG: CPT | Mod: CPTII,,, | Performed by: OBSTETRICS & GYNECOLOGY

## 2023-06-02 PROCEDURE — 99284 EMERGENCY DEPT VISIT MOD MDM: CPT | Mod: 25,,, | Performed by: OBSTETRICS & GYNECOLOGY

## 2023-06-02 PROCEDURE — 99999 PR PBB SHADOW E&M-EST. PATIENT-LVL III: CPT | Mod: PBBFAC,,, | Performed by: OBSTETRICS & GYNECOLOGY

## 2023-06-02 PROCEDURE — 76816 OB US FOLLOW-UP PER FETUS: CPT | Mod: 26,S$PBB,, | Performed by: OBSTETRICS & GYNECOLOGY

## 2023-06-02 PROCEDURE — 99284 PR EMERGENCY DEPT VISIT,LEVEL IV: ICD-10-PCS | Mod: 25,,, | Performed by: OBSTETRICS & GYNECOLOGY

## 2023-06-02 PROCEDURE — 25000003 PHARM REV CODE 250: Performed by: OBSTETRICS & GYNECOLOGY

## 2023-06-02 PROCEDURE — 59025 FETAL NON-STRESS TEST: CPT

## 2023-06-02 PROCEDURE — 99215 OFFICE O/P EST HI 40 MIN: CPT | Mod: 25,S$PBB,TH, | Performed by: OBSTETRICS & GYNECOLOGY

## 2023-06-02 PROCEDURE — 99999 PR PBB SHADOW E&M-EST. PATIENT-LVL III: ICD-10-PCS | Mod: PBBFAC,,, | Performed by: OBSTETRICS & GYNECOLOGY

## 2023-06-02 PROCEDURE — 85025 COMPLETE CBC W/AUTO DIFF WBC: CPT

## 2023-06-02 PROCEDURE — 3008F PR BODY MASS INDEX (BMI) DOCUMENTED: ICD-10-PCS | Mod: CPTII,,, | Performed by: OBSTETRICS & GYNECOLOGY

## 2023-06-02 PROCEDURE — 3079F PR MOST RECENT DIASTOLIC BLOOD PRESSURE 80-89 MM HG: ICD-10-PCS | Mod: CPTII,,, | Performed by: OBSTETRICS & GYNECOLOGY

## 2023-06-02 PROCEDURE — 99215 PR OFFICE/OUTPT VISIT, EST, LEVL V, 40-54 MIN: ICD-10-PCS | Mod: 25,S$PBB,TH, | Performed by: OBSTETRICS & GYNECOLOGY

## 2023-06-02 PROCEDURE — 80053 COMPREHEN METABOLIC PANEL: CPT

## 2023-06-02 PROCEDURE — 3079F DIAST BP 80-89 MM HG: CPT | Mod: CPTII,,, | Performed by: OBSTETRICS & GYNECOLOGY

## 2023-06-02 PROCEDURE — 84156 ASSAY OF PROTEIN URINE: CPT

## 2023-06-02 PROCEDURE — 76816 OB US FOLLOW-UP PER FETUS: CPT | Mod: PBBFAC | Performed by: OBSTETRICS & GYNECOLOGY

## 2023-06-02 RX ORDER — POTASSIUM CHLORIDE 20 MEQ/1
40 TABLET, EXTENDED RELEASE ORAL ONCE
Status: COMPLETED | OUTPATIENT
Start: 2023-06-02 | End: 2023-06-02

## 2023-06-02 RX ADMIN — POTASSIUM CHLORIDE 40 MEQ: 1500 TABLET, EXTENDED RELEASE ORAL at 01:06

## 2023-06-02 NOTE — DISCHARGE INSTRUCTIONS
Please return to [facility] for the following:     - contractions 2-5 minutes apart   - big gush of fluid like your water broke   - blood pressure over 140/90  - pain under right breast, blurry vision, headache, etc     For questions or concerns:   Labor and Delivery: [phone] option 1

## 2023-06-02 NOTE — ED NOTES
Udip performed. The results are as followed:     Trace leukocytes   Trace protein   7 pH   1.020 SpGrav.     All other values are normal/negative.   Dr. Plunkett notified.

## 2023-06-02 NOTE — ED PROVIDER NOTES
Encounter Date: 2023       History     Chief Complaint   Patient presents with    Hypertension     34yo  at 26w4d sent to OB ED due to mild range BP in the office.  Currently denies any h/o hypertensive disorders, not taking any BP medication.  Denies HA/visual changes/RUQ pain.  No ctx/vb/lof.  Good fetal movement.  Also reports mucousy discharge, but no true leaking fluid.  Normal fluid on US today.  No other complaints today.     Prenatal care with Dr. Edwards in [place].  Pregnancy complicated by echogenic bowel and +DS on quad screen with negative cfDNA testing, declines amnio.       Review of patient's allergies indicates:  No Known Allergies  Past Medical History:   Diagnosis Date    ADHD (attention deficit hyperactivity disorder)     Depression     Thyroid condition      Past Surgical History:   Procedure Laterality Date    TONSILLECTOMY       Family History   Problem Relation Age of Onset    ALS Maternal Grandfather     Cancer Mother      Social History     Tobacco Use    Smoking status: Every Day     Packs/day: 0.50     Types: Cigarettes, Vaping with nicotine   Substance Use Topics    Alcohol use: Not Currently     Comment: socially    Drug use: No     Review of Systems   Constitutional:  Negative for chills and fever.   HENT:  Negative for congestion and sore throat.    Eyes:  Negative for visual disturbance.   Respiratory:  Negative for cough and shortness of breath.    Cardiovascular:  Negative for chest pain and palpitations.   Gastrointestinal:  Negative for abdominal pain, diarrhea, nausea and vomiting.   Genitourinary:  Negative for vaginal bleeding.   Musculoskeletal:  Negative for myalgias.   Skin:  Negative for rash.   Neurological:  Negative for seizures and headaches.     Physical Exam     Initial Vitals [23 1143]   BP Pulse Resp Temp SpO2   130/79 85 16 98.2 °F (36.8 °C) 97 %      MAP       --         Vitals:    23 1149 23 1152 23 1153 23 1157   BP:        Pulse: 86 88 87 89   Resp:       Temp:       TempSrc:       SpO2:  95%  96%    06/02/23 1159 06/02/23 1224 06/02/23 1227 06/02/23 1228   BP: 112/60   129/76   Pulse: 80 92 85 82   Resp:       Temp:       TempSrc:       SpO2:  95%      06/02/23 1229 06/02/23 1231 06/02/23 1234 06/02/23 1235   BP:       Pulse: 85 96 83 104   Resp:       Temp:       TempSrc:       SpO2: 95%  97%     06/02/23 1239 06/02/23 1243 06/02/23 1258   BP:  136/85 136/81   Pulse: 83 85    Resp:      Temp:      TempSrc:      SpO2: 96%           Physical Exam    Constitutional: She appears well-developed and well-nourished.   HENT:   Head: Normocephalic and atraumatic.   Eyes: EOM are normal.   Neck:   Normal range of motion.  Cardiovascular:  Normal rate.           Pulmonary/Chest: No respiratory distress.   Abdominal: Abdomen is soft. She exhibits no distension. There is no abdominal tenderness.   Musculoskeletal:         General: No tenderness. Normal range of motion.      Cervical back: Normal range of motion.     Neurological: She is alert and oriented to person, place, and time.   Skin: Skin is warm and dry.   Psychiatric: She has a normal mood and affect.     OB LABOR EXAM:                       Comments: Cervix closed/thick/posterior    SSE: no pooling, negative nitrazine     ED Course   Fetal non-stress test    Date/Time: 6/2/2023 1:29 PM  Performed by: Analia Plunkett MD  Authorized by: Analia Plunkett MD     Nonstress Test:     Variability:  6-25 BPM    Decelerations:  None    Accelerations:  10 bpm    Baseline:  150    Contractions:  Not present  Biophysical Profile:     Nonstress Test Interpretation: appropriate for gestational age      Overall Impression:  Reassuring  Labs Reviewed   CBC W/ AUTO DIFFERENTIAL - Abnormal; Notable for the following components:       Result Value    RBC 3.93 (*)     Hemoglobin 11.9 (*)     Hematocrit 34.0 (*)     Gran # (ANC) 8.1 (*)     Gran % 77.9 (*)     Lymph % 17.2 (*)     All other  components within normal limits   COMPREHENSIVE METABOLIC PANEL - Abnormal; Notable for the following components:    Potassium 3.2 (*)     CO2 22 (*)     Glucose 121 (*)     BUN 5 (*)     Total Protein 5.8 (*)     Albumin 2.8 (*)     AST 9 (*)     ALT 9 (*)     All other components within normal limits   PROTEIN / CREATININE RATIO, URINE    Narrative:     Specimen Source->Urine     PCR 0.08       Imaging Results    None          Medications   potassium chloride SA CR tablet 40 mEq (has no administration in time range)     Medical Decision Making:   ED Management:  32yo  at 26w4d with elevated BP in clinic  - VSS, afebrile  - Normal range serial BP in OB ED  - Labs wnl, except hypokalemia, repleted in OB ED  - NST reactive, reassuring for gestational age.  No ctx  - PPROM exam negative, cvx closed  - Overall stable for discharge home, strict return precautions given.  All questions answered.                         Clinical Impression:   Final diagnoses:  [R03.0] Elevated blood pressure reading (Primary)               Analia Plunkett MD  [phone]

## 2023-06-02 NOTE — PROGRESS NOTES
MATERNAL-FETAL MEDICINE   FOLLOW UP NOTE      SUBJECTIVE:     Ms. Nirali Chavez is a 33 y.o.  female with IUP at 26w4d who is seen in consultation by MFM for evaluation and management of:  Fetal echogenic bowel  Abnormal quad screen positive for Down syndrome: cell free DNA screen negative   Tobacco use during pregnancy     Patient accompanied by FOB/boyfriend (Bernabe)    Previous notes reviewed in Epic and Viewpoint     No changes to medical, surgical, family, social, or obstetric history.    Interval history since last MFM visit: patient reports leakage during pregnancy (reports Dr. Edwards has evaluated previously and no  PROM), she reports increase mucus discharge.     Medications:  Current Outpatient Medications   Medication Instructions    aspirin (ECOTRIN) 81 mg, Oral, Daily    prenatal vit no.124/iron/folic (PRENATAL VITAMIN ORAL) Oral, Daily    promethazine-dextromethorphan (PROMETHAZINE-DM) 6.25-15 mg/5 mL Syrp Take one tsp po q 6 hrs prn cough    sertraline (ZOLOFT) 100 mg, Oral, Nightly         Objective:   BP (!) 142/84 (BP Location: Right arm, Patient Position: Sitting, BP Method: Medium (Manual))   Wt 91.4 kg (201 lb 8 oz)   LMP 10/31/2022 (Exact Date)   BMI 35.69 kg/m²     Ultrasound performed. See viewpoint for full ultrasound report.  1. 26w 4d conner intrauterine pregnancy  2. Interval fetal growth wnl with EFW = 806 gms at 24% and AC 17%  3. No fetal structural abnormalities appreciated for organs evaluated today  4. Amniotic fluid volume wnl per mvp 5.3 cm    Patient counseled on today's ultrasound evaluation and recommendations for future ultrasounds for fetal growth, f/u bowel. Patient's questions were answered.       ASSESSMENT/PLAN:     33 y.o.  female with IUP at 26w4d     Patient with elevated Bp's today. Hx of Preeclampsia at term previous. Patient counseled on recommendation to be sent to [facility] for evaluation of her elevated BPs (rule out preeclampsia) and to  r/o  PROM secondary to her complaint of leakage/increased mucus discharge.     Patient does not report headache, blurred vision or RUQ pain       FOLLOW UP:   -patient sent to [facility] for evaluation of her elevated BPs - rule out preeclampsia, and r/o  PROM (patient c/o leakage/mucus)    -ultrasound and RT MFM MD visit scheduled for 23: interval fetal growth, f/u bowel, and amniotic fluid volume      About about 35-40  minutes of total time spent on the encounter, which includes face to face time and non-face to face time preparing to see the patient (eg, review of tests), obtaining and/or reviewing separately obtained history, documenting clinical information in the electronic or other health record, independently interpreting results (not separately reported) and communicating results to the patient/family/caregiver, or care coordination (not separately reported).      Val Daley  Maternal-Fetal Medicine    Electronically Signed by Val Daley 2023

## 2023-06-28 ENCOUNTER — PATIENT MESSAGE (OUTPATIENT)
Dept: MATERNAL FETAL MEDICINE | Facility: CLINIC | Age: 33
End: 2023-06-28
Payer: MEDICAID

## 2023-06-29 ENCOUNTER — OFFICE VISIT (OUTPATIENT)
Dept: MATERNAL FETAL MEDICINE | Facility: CLINIC | Age: 33
End: 2023-06-29
Payer: MEDICAID

## 2023-06-29 ENCOUNTER — PROCEDURE VISIT (OUTPATIENT)
Dept: MATERNAL FETAL MEDICINE | Facility: CLINIC | Age: 33
End: 2023-06-29
Payer: MEDICAID

## 2023-06-29 VITALS — WEIGHT: 194 LBS | SYSTOLIC BLOOD PRESSURE: 135 MMHG | DIASTOLIC BLOOD PRESSURE: 85 MMHG | BODY MASS INDEX: 34.37 KG/M2

## 2023-06-29 DIAGNOSIS — Z72.0 TOBACCO ABUSE: ICD-10-CM

## 2023-06-29 DIAGNOSIS — Z36.89 ENCOUNTER FOR ULTRASOUND TO ASSESS FETAL GROWTH: Primary | ICD-10-CM

## 2023-06-29 DIAGNOSIS — Z36.9 ENCOUNTER FOR FETAL ULTRASOUND: Primary | ICD-10-CM

## 2023-06-29 DIAGNOSIS — O36.5990 FETAL GROWTH RESTRICTION ANTEPARTUM: ICD-10-CM

## 2023-06-29 DIAGNOSIS — Z36.89 ENCOUNTER FOR ULTRASOUND TO ASSESS FETAL GROWTH: ICD-10-CM

## 2023-06-29 DIAGNOSIS — O28.3 ECHOGENIC BOWEL OF FETUS ON PRENATAL ULTRASOUND: ICD-10-CM

## 2023-06-29 DIAGNOSIS — O28.5 ABNORMAL CHROMOSOMAL AND GENETIC FINDING ON ANTENATAL SCREENING MOTHER: ICD-10-CM

## 2023-06-29 PROCEDURE — 3008F PR BODY MASS INDEX (BMI) DOCUMENTED: ICD-10-PCS | Mod: CPTII,,, | Performed by: OBSTETRICS & GYNECOLOGY

## 2023-06-29 PROCEDURE — 99999 PR PBB SHADOW E&M-EST. PATIENT-LVL II: ICD-10-PCS | Mod: PBBFAC,,, | Performed by: OBSTETRICS & GYNECOLOGY

## 2023-06-29 PROCEDURE — 76816 OB US FOLLOW-UP PER FETUS: CPT | Mod: 26,S$PBB,, | Performed by: OBSTETRICS & GYNECOLOGY

## 2023-06-29 PROCEDURE — 3079F PR MOST RECENT DIASTOLIC BLOOD PRESSURE 80-89 MM HG: ICD-10-PCS | Mod: CPTII,,, | Performed by: OBSTETRICS & GYNECOLOGY

## 2023-06-29 PROCEDURE — 99215 PR OFFICE/OUTPT VISIT, EST, LEVL V, 40-54 MIN: ICD-10-PCS | Mod: 25,S$PBB,TH, | Performed by: OBSTETRICS & GYNECOLOGY

## 2023-06-29 PROCEDURE — 3008F BODY MASS INDEX DOCD: CPT | Mod: CPTII,,, | Performed by: OBSTETRICS & GYNECOLOGY

## 2023-06-29 PROCEDURE — 1159F PR MEDICATION LIST DOCUMENTED IN MEDICAL RECORD: ICD-10-PCS | Mod: CPTII,,, | Performed by: OBSTETRICS & GYNECOLOGY

## 2023-06-29 PROCEDURE — 99215 OFFICE O/P EST HI 40 MIN: CPT | Mod: 25,S$PBB,TH, | Performed by: OBSTETRICS & GYNECOLOGY

## 2023-06-29 PROCEDURE — 3075F SYST BP GE 130 - 139MM HG: CPT | Mod: CPTII,,, | Performed by: OBSTETRICS & GYNECOLOGY

## 2023-06-29 PROCEDURE — 76819 FETAL BIOPHYS PROFIL W/O NST: CPT | Mod: 26,S$PBB,, | Performed by: OBSTETRICS & GYNECOLOGY

## 2023-06-29 PROCEDURE — 99212 OFFICE O/P EST SF 10 MIN: CPT | Mod: PBBFAC,TH | Performed by: OBSTETRICS & GYNECOLOGY

## 2023-06-29 PROCEDURE — 99999 PR PBB SHADOW E&M-EST. PATIENT-LVL II: CPT | Mod: PBBFAC,,, | Performed by: OBSTETRICS & GYNECOLOGY

## 2023-06-29 PROCEDURE — 1160F PR REVIEW ALL MEDS BY PRESCRIBER/CLIN PHARMACIST DOCUMENTED: ICD-10-PCS | Mod: CPTII,,, | Performed by: OBSTETRICS & GYNECOLOGY

## 2023-06-29 PROCEDURE — 76816 PR  US,PREGNANT UTERUS,F/U,TRANSABD APP: ICD-10-PCS | Mod: 26,S$PBB,, | Performed by: OBSTETRICS & GYNECOLOGY

## 2023-06-29 PROCEDURE — 76820 PR US, OB DOPPLER, FETAL UMBILICAL ARTERY ECHO: ICD-10-PCS | Mod: 26,S$PBB,, | Performed by: OBSTETRICS & GYNECOLOGY

## 2023-06-29 PROCEDURE — 1159F MED LIST DOCD IN RCRD: CPT | Mod: CPTII,,, | Performed by: OBSTETRICS & GYNECOLOGY

## 2023-06-29 PROCEDURE — 76819 FETAL BIOPHYS PROFIL W/O NST: CPT | Mod: PBBFAC | Performed by: OBSTETRICS & GYNECOLOGY

## 2023-06-29 PROCEDURE — 3075F PR MOST RECENT SYSTOLIC BLOOD PRESS GE 130-139MM HG: ICD-10-PCS | Mod: CPTII,,, | Performed by: OBSTETRICS & GYNECOLOGY

## 2023-06-29 PROCEDURE — 76820 UMBILICAL ARTERY ECHO: CPT | Mod: PBBFAC | Performed by: OBSTETRICS & GYNECOLOGY

## 2023-06-29 PROCEDURE — 1160F RVW MEDS BY RX/DR IN RCRD: CPT | Mod: CPTII,,, | Performed by: OBSTETRICS & GYNECOLOGY

## 2023-06-29 PROCEDURE — 76816 OB US FOLLOW-UP PER FETUS: CPT | Mod: PBBFAC | Performed by: OBSTETRICS & GYNECOLOGY

## 2023-06-29 PROCEDURE — 76820 UMBILICAL ARTERY ECHO: CPT | Mod: 26,S$PBB,, | Performed by: OBSTETRICS & GYNECOLOGY

## 2023-06-29 PROCEDURE — 3079F DIAST BP 80-89 MM HG: CPT | Mod: CPTII,,, | Performed by: OBSTETRICS & GYNECOLOGY

## 2023-06-29 PROCEDURE — 76819 PR US, OB, FETAL BIOPHYSICAL, W/O NST: ICD-10-PCS | Mod: 26,S$PBB,, | Performed by: OBSTETRICS & GYNECOLOGY

## 2023-06-29 NOTE — PROGRESS NOTES
Maternal Fetal Medicine follow up consult    SUBJECTIVE:     Nirali Chavez is a 33 y.o.  female with IUP at 30w3d who is seen in follow up consultation by MFM.  Pregnancy complications include:   Problem   Fetal Growth Restriction Antepartum   Abnormal Chromosomal and Genetic Finding On  Screening Mother   Echogenic Bowel of Fetus On Prenatal Ultrasound   Tobacco Abuse       Previous notes reviewed.   No changes to medical, surgical, family, social, or obstetric history.    Interval history since last MFM visit: no major interval problems    Medications:  Current Outpatient Medications   Medication Instructions    aspirin (ECOTRIN) 81 mg, Oral, Daily    ergocalciferol, vitamin D2, (VITAMIN D ORAL) Oral, Daily, Gummies    prenatal vit no.124/iron/folic (PRENATAL VITAMIN ORAL) Oral, Daily    sertraline (ZOLOFT) 100 mg, Oral, Nightly       Care team members:  Dr. Harrison Edwards - Primary OB     OBJECTIVE:   /85 (BP Location: Left arm, Patient Position: Sitting, BP Method: Medium (Automatic))   Wt 88 kg (194 lb 0.1 oz)   LMP 10/31/2022 (Exact Date)   BMI 34.37 kg/m²       Ultrasound  performed. See viewpoint for full ultrasound report.  FGR is identified on today's study. The EFW plots at the 9%, and the AC plots at the 2%. The femur length plots at < 1st but there has been a consistent 2-3 week lag in femur length. Mildly echogenic area of bowel again noted.  The EFW is 1152 g.  AFV is normal, and the BPP score is reassuring at 8/8.   Umbilical artery Doppler S/D ratios are normal.         ASSESSMENT/PLAN:     33 y.o.  female with IUP at 30w3d    Abnormal chromosomal and genetic finding on  screening mother  Serum screen 1:28 DSR--hCG 2.0 MoM, HAYDEN 3.3 MoM; uE3 0.52 MoM so consistent with increased risk for placenta dysfunction  cfDNA negative    See previous MFM notes for full consultation.    Patient reports that she did not even realize she was pregnant for the 1st couple of  months because she continued to have bleeding.  I explained that 1 of the reasons for an abnormal quad screen with this pattern of analytes can be poor placental development in the 1st trimester.  Later in pregnancy this is associated with an increased risk for fetal growth restriction and this may be what we are currently seeing.    Echogenic bowel of fetus on prenatal ultrasound  See previous [facility] notes for full consultation.  Echogenic bowel improved on today's images but still visible.  The patient had negative cystic fibrosis carrier screening.   She reports that she had COVID earlier in pregnancy but denies other febrile illnesses. No history concerning for CMV infection. We reviewed the limitations of CMV testing on maternal serum. She has a positive CMV IgM but IgG has remained negative.     FGR noted on US today--see FGR section for recommendations        Tobacco abuse  Patient counseled on cessation of tobacco use and avoidance of second hand smoke inhalation for duration of the pregnancy and postpartum period secondary to the associated fetal/ risks that include the following: spontaneous , placental abruption, low birth weight, oligohydramnios, non-reassuring fetal status, and sudden infant death syndrome (SIDS).    She reports she has stopped smoking and is only using a no nicotine vape. Explained that this can still be associated with increased risk. Encouraged cessation and avoidance of secondhand smoke.      Fetal growth restriction antepartum  The patients fetus has been diagnosed with FGR based on EFW < 10th percentile/AC < 10th percentile. Potential etiologies of FGR include but are not limited to normal variation of stature, placental insufficiency, chromosomal abnormalities, genetic disorders, infections, medical conditions, teratogen exposure and other etiologies. Pregnancies complicated by FGR are at increased risk of stillbirth. We discussed delivery timing and recommendations  for antepartum testing. FGR, by itself, is not an indication for  delivery, although there is an increased risk of needing a  due to heart rate abnormalities. Mode of delivery will be dictated by overall clinical status and doppler abnormalities (if any).     We discussed that overall fetal growth has been borderline since the beginning of May when it was at the 11th percentile.  The abdominal circumference has always been less than the 10th percentile.  Femur length has also legged consistently by 2 to 3 weeks.  She had an abnormal quad screen which increases the risk of 3rd trimester growth abnormalities.  She also had consistent bleeding for the 1st couple of months of her pregnancy that she thought was her cycle.    Recommendations:  Start twice weekly  fetal surveillance with NST and BPP until delivery--she will come to [facility] once a week for BPP and Dopplers on Thursday and will have an NST with Dr. Edwards on .  Start weekly UA dopplers.   Repeat fetal growth ultrasound in 3 weeks  Patient counseled re: fetal movement monitoring and decreased fetal movement  Monitor closely for any signs of evolving preeclampsia.  If  testing is non-reassuring, delivery may be warranted regardless of GA.  For all pregnancies with FGR, the placenta should be sent to pathology for examination.  Mode of delivery will be dictated by overall clinical status and doppler abnormalities (if any).     General delivery timing (EFW as opposed to AC percentile should be used to determine delivery timing):  Exceptions to these recommendations may occur, however, in general, delivery should be considered when:    FGR (EFW 3rd-9th or EFW > 10th with AC < 10th percentile)  Normal testing, No co-morbid conditions: 380/7- 390/7  UA Doppler S/D ratio > 95th percentile: 370/7- 376/7  Co-morbid conditions: 370/7- 376/7 (see below)    FGR (< 3rd percentile)  Normal testing, no co-morbid conditions: 370/7-  376/7  Co-morbid conditions: 360/7- 376/7 weeks (see below)    Maternal co-morbid conditions - CHTN, pregestational DM, renal disease, autoimmune disease, AMA ? 40    Earlier delivery can be considered in conjunction with MFM in the setting of FGR with preeclampsia/gestational hypertension (360/7-370/7 weeks), multifetal pregnancy, or UA Doppler abnormalities (ie EFW < 3rd percentile with elevated UA Dopplers)     FGR + Oligohydramnios: At diagnosis, if GA ? 34 weeks (if less than 34 weeks, management needs to be individualized based on other testing and entire clinical scenario)  FGR + AEDF: By 34 weeks (33 0/7-34 6/7 weeks) if there is absent diastolic flow in the umbilical artery and there has not been a previous indication for delivery  FGR + REDF: By 32 weeks (30 0/7-32 6/7 weeks), if there is reversed diastolic flow in the umbilical artery and there has not been a previous indication for delivery        Start twice weekly testing.  F/u in 3 weeks for MFM visit and US      45 minutes of total time spent on the encounter, which includes face to face time and non-face to face time preparing to see the patient (eg, review of tests), obtaining and/or reviewing separately obtained history, documenting clinical information in the electronic or other health record, independently interpreting results (not separately reported) and communicating results to the patient/family/caregiver, or care coordination (not separately reported).    Sharath Eubanks  Maternal-Fetal Medicine    Electronically Signed by Sharath Eubanks June 29, 2023

## 2023-06-29 NOTE — ASSESSMENT & PLAN NOTE
The patients fetus has been diagnosed with FGR based on EFW < 10th percentile/AC < 10th percentile. Potential etiologies of FGR include but are not limited to normal variation of stature, placental insufficiency, chromosomal abnormalities, genetic disorders, infections, medical conditions, teratogen exposure and other etiologies. Pregnancies complicated by FGR are at increased risk of stillbirth. We discussed delivery timing and recommendations for antepartum testing. FGR, by itself, is not an indication for  delivery, although there is an increased risk of needing a  due to heart rate abnormalities. Mode of delivery will be dictated by overall clinical status and doppler abnormalities (if any).     We discussed that overall fetal growth has been borderline since the beginning of May when it was at the 11th percentile.  The abdominal circumference has always been less than the 10th percentile.  Femur length has also legged consistently by 2 to 3 weeks.  She had an abnormal quad screen which increases the risk of 3rd trimester growth abnormalities.  She also had consistent bleeding for the 1st couple of months of her pregnancy that she thought was her cycle.    Recommendations:  · Start twice weekly  fetal surveillance with NST and BPP until delivery--she will come to [facility] once a week for BPP and Dopplers on Thursday and will have an NST with Dr. Edwards on .  · Start weekly UA dopplers.   · Repeat fetal growth ultrasound in 3 weeks  · Patient counseled re: fetal movement monitoring and decreased fetal movement  · Monitor closely for any signs of evolving preeclampsia.  · If  testing is non-reassuring, delivery may be warranted regardless of GA.  · For all pregnancies with FGR, the placenta should be sent to pathology for examination.  · Mode of delivery will be dictated by overall clinical status and doppler abnormalities (if any).     General delivery timing (EFW as opposed  to AC percentile should be used to determine delivery timing):  Exceptions to these recommendations may occur, however, in general, delivery should be considered when:    FGR (EFW 3rd-9th or EFW > 10th with AC < 10th percentile)   Normal testing, No co-morbid conditions: 380/7- 390/7   UA Doppler S/D ratio > 95th percentile: 370/7- 376/7   Co-morbid conditions: 370/7- 376/7 (see below)    FGR (< 3rd percentile)   Normal testing, no co-morbid conditions: 370/7- 376/7   Co-morbid conditions: 360/7- 376/7 weeks (see below)    Maternal co-morbid conditions - CHTN, pregestational DM, renal disease, autoimmune disease, AMA ? 40    Earlier delivery can be considered in conjunction with MFM in the setting of FGR with preeclampsia/gestational hypertension (360/7-370/7 weeks), multifetal pregnancy, or UA Doppler abnormalities (ie EFW < 3rd percentile with elevated UA Dopplers)     FGR + Oligohydramnios: At diagnosis, if GA ? 34 weeks (if less than 34 weeks, management needs to be individualized based on other testing and entire clinical scenario)  FGR + AEDF: By 34 weeks (33 0/7-34 6/7 weeks) if there is absent diastolic flow in the umbilical artery and there has not been a previous indication for delivery  FGR + REDF: By 32 weeks (30 0/7-32 6/7 weeks), if there is reversed diastolic flow in the umbilical artery and there has not been a previous indication for delivery

## 2023-06-29 NOTE — ASSESSMENT & PLAN NOTE
Serum screen 1:28 DSR--hCG 2.0 MoM, HAYDEN 3.3 MoM; uE3 0.52 MoM so consistent with increased risk for placenta dysfunction  cfDNA negative    See previous [facility] notes for full consultation.    Patient reports that she did not even realize she was pregnant for the 1st couple of months because she continued to have bleeding.  I explained that 1 of the reasons for an abnormal quad screen with this pattern of analytes can be poor placental development in the 1st trimester.  Later in pregnancy this is associated with an increased risk for fetal growth restriction and this may be what we are currently seeing.

## 2023-06-29 NOTE — ASSESSMENT & PLAN NOTE
Patient counseled on cessation of tobacco use and avoidance of second hand smoke inhalation for duration of the pregnancy and postpartum period secondary to the associated fetal/ risks that include the following: spontaneous , placental abruption, low birth weight, oligohydramnios, non-reassuring fetal status, and sudden infant death syndrome (SIDS).    She reports she has stopped smoking and is only using a no nicotine vape. Explained that this can still be associated with increased risk. Encouraged cessation and avoidance of secondhand smoke.

## 2023-06-29 NOTE — ASSESSMENT & PLAN NOTE
See previous [facility] notes for full consultation.  Echogenic bowel improved on today's images but still visible.  The patient had negative cystic fibrosis carrier screening.   She reports that she had COVID earlier in pregnancy but denies other febrile illnesses. No history concerning for CMV infection. We reviewed the limitations of CMV testing on maternal serum. She has a positive CMV IgM but IgG has remained negative.     FGR noted on US today--see FGR section for recommendations

## 2023-07-03 ENCOUNTER — HOSPITAL ENCOUNTER (OUTPATIENT)
Facility: HOSPITAL | Age: 33
Discharge: HOME | End: 2023-07-03
Attending: SPECIALIST | Admitting: SPECIALIST
Payer: MEDICAID

## 2023-07-03 VITALS — HEART RATE: 90 BPM | DIASTOLIC BLOOD PRESSURE: 78 MMHG | SYSTOLIC BLOOD PRESSURE: 134 MMHG

## 2023-07-03 DIAGNOSIS — O09.299 HISTORY OF PRE-ECLAMPSIA IN PRIOR PREGNANCY, CURRENTLY PREGNANT: ICD-10-CM

## 2023-07-03 PROCEDURE — 59025 FETAL NON-STRESS TEST: CPT

## 2023-07-05 ENCOUNTER — PATIENT MESSAGE (OUTPATIENT)
Dept: MATERNAL FETAL MEDICINE | Facility: CLINIC | Age: 33
End: 2023-07-05
Payer: MEDICAID

## 2023-07-06 ENCOUNTER — PROCEDURE VISIT (OUTPATIENT)
Dept: MATERNAL FETAL MEDICINE | Facility: CLINIC | Age: 33
End: 2023-07-06
Payer: MEDICAID

## 2023-07-06 DIAGNOSIS — Z36.9 ENCOUNTER FOR FETAL ULTRASOUND: ICD-10-CM

## 2023-07-06 PROCEDURE — 76819 FETAL BIOPHYS PROFIL W/O NST: CPT | Mod: 26,S$PBB,, | Performed by: OBSTETRICS & GYNECOLOGY

## 2023-07-06 PROCEDURE — 76819 US MFM PROCEDURE (VIEWPOINT): ICD-10-PCS | Mod: 26,S$PBB,, | Performed by: OBSTETRICS & GYNECOLOGY

## 2023-07-06 PROCEDURE — 76820 US MFM PROCEDURE (VIEWPOINT): ICD-10-PCS | Mod: 26,S$PBB,, | Performed by: OBSTETRICS & GYNECOLOGY

## 2023-07-06 PROCEDURE — 76820 UMBILICAL ARTERY ECHO: CPT | Mod: PBBFAC | Performed by: OBSTETRICS & GYNECOLOGY

## 2023-07-10 ENCOUNTER — HOSPITAL ENCOUNTER (OUTPATIENT)
Facility: HOSPITAL | Age: 33
Discharge: HOME | End: 2023-07-10
Attending: SPECIALIST | Admitting: SPECIALIST
Payer: MEDICAID

## 2023-07-10 VITALS — RESPIRATION RATE: 16 BRPM | SYSTOLIC BLOOD PRESSURE: 128 MMHG | DIASTOLIC BLOOD PRESSURE: 88 MMHG | HEART RATE: 77 BPM

## 2023-07-10 DIAGNOSIS — O09.299 HX OF PREECLAMPSIA, PRIOR PREGNANCY, CURRENTLY PREGNANT: ICD-10-CM

## 2023-07-10 PROCEDURE — 59025 FETAL NON-STRESS TEST: CPT | Mod: 59

## 2023-07-10 NOTE — PROGRESS NOTES
07/10/23 1113   Vital Signs   /88   MAP (mmHg) 104   Pulse 77   Resp 16   Non Stress Test - General   $ NST Procedure Charge Completed   Indications/Pt Reported Complaint Hx of Pre-e and DEREK.   Test Duration (min) 35 min   Number of Fetuses 1   Acoustic Stimulator No   Contractions Not present   Nonstress Test Baby A   Variability Moderate   Accels Present   Baseline    Interpretation Baby A   Nonstress Test Interpretation Reactive   Overall Impression Reassuring

## 2023-07-10 NOTE — NURSING
BPP results 6/8 for breathing. Dr. Edwards notified. Orders recv'd to dc pt home but before doing so to instruct pt to return for another NST tomorrow, 7/11/2023 and to adequately hydrate and rest overnight. Pt verbalized understanding and made appointment to return tomorrow for a follow-up NST.

## 2023-07-10 NOTE — PROGRESS NOTES
07/10/23 1137   Vital Signs   /88   MAP (mmHg) 104   Pulse 77   Resp 16   Non Stress Test - General   Indications/Pt Reported Complaint Hx of Pre-e and DEREK.   Acoustic Stimulator No   Contractions Not present   Nonstress Test Baby A   Variability Moderate   Decels None   Accels Absent   Baseline    Interpretation Baby A   Nonstress Test Interpretation (!) Non-reactive   Overall Impression (!) Non-reassuring   Comments Strip reviewed by Dr. Jerry london for BPP.

## 2023-07-18 ENCOUNTER — HOSPITAL ENCOUNTER (OUTPATIENT)
Facility: HOSPITAL | Age: 33
Discharge: HOME | End: 2023-07-18
Attending: SPECIALIST | Admitting: SPECIALIST
Payer: MEDICAID

## 2023-07-18 VITALS — SYSTOLIC BLOOD PRESSURE: 131 MMHG | HEART RATE: 91 BPM | DIASTOLIC BLOOD PRESSURE: 83 MMHG | RESPIRATION RATE: 18 BRPM

## 2023-07-18 PROCEDURE — 59025 FETAL NON-STRESS TEST: CPT

## 2023-07-18 NOTE — PROGRESS NOTES
07/18/23 1732   Vital Signs   /83   MAP (mmHg) 102   Pulse 91   Resp 18   Non Stress Test - General   $ NST Procedure Charge Completed   Indications/Pt Reported Complaint IUGR Hx of Pre-e and DEREK   Test Duration (min) 60 min   Number of Fetuses 1   Acoustic Stimulator No   Contractions Not present   Nonstress Test Baby A   Variability Moderate   Decels None   Accels Present   Baseline    Interpretation Baby A   Nonstress Test Interpretation Reactive   Overall Impression Reassuring   Comments Dr. Edwards reviewed pt strip. Orders recvd to dc pt home.

## 2023-07-19 ENCOUNTER — PATIENT MESSAGE (OUTPATIENT)
Dept: MATERNAL FETAL MEDICINE | Facility: CLINIC | Age: 33
End: 2023-07-19
Payer: MEDICAID

## 2023-07-20 ENCOUNTER — PROCEDURE VISIT (OUTPATIENT)
Dept: MATERNAL FETAL MEDICINE | Facility: CLINIC | Age: 33
End: 2023-07-20
Payer: MEDICAID

## 2023-07-20 ENCOUNTER — OFFICE VISIT (OUTPATIENT)
Dept: MATERNAL FETAL MEDICINE | Facility: CLINIC | Age: 33
End: 2023-07-20
Payer: MEDICAID

## 2023-07-20 VITALS
SYSTOLIC BLOOD PRESSURE: 136 MMHG | HEIGHT: 63 IN | DIASTOLIC BLOOD PRESSURE: 92 MMHG | WEIGHT: 194.69 LBS | BODY MASS INDEX: 34.5 KG/M2

## 2023-07-20 DIAGNOSIS — O35.HXX9: ICD-10-CM

## 2023-07-20 DIAGNOSIS — O36.5990 FETAL GROWTH RESTRICTION ANTEPARTUM: ICD-10-CM

## 2023-07-20 DIAGNOSIS — O13.3 GESTATIONAL HYPERTENSION, THIRD TRIMESTER: ICD-10-CM

## 2023-07-20 DIAGNOSIS — O36.5990 FETAL GROWTH RESTRICTION ANTEPARTUM: Primary | ICD-10-CM

## 2023-07-20 DIAGNOSIS — Z36.89 ENCOUNTER FOR ULTRASOUND TO ASSESS FETAL GROWTH: ICD-10-CM

## 2023-07-20 DIAGNOSIS — Z72.0 TOBACCO ABUSE: ICD-10-CM

## 2023-07-20 DIAGNOSIS — Z36.89 ENCOUNTER FOR ULTRASOUND TO ASSESS FETAL GROWTH: Primary | ICD-10-CM

## 2023-07-20 PROBLEM — J06.9 URI (UPPER RESPIRATORY INFECTION): Status: RESOLVED | Noted: 2022-11-05 | Resolved: 2023-07-20

## 2023-07-20 PROBLEM — B02.9 SHINGLES: Status: RESOLVED | Noted: 2022-11-05 | Resolved: 2023-07-20

## 2023-07-20 PROCEDURE — 99215 PR OFFICE/OUTPT VISIT, EST, LEVL V, 40-54 MIN: ICD-10-PCS | Mod: 25,S$PBB,TH, | Performed by: OBSTETRICS & GYNECOLOGY

## 2023-07-20 PROCEDURE — 99999 PR PBB SHADOW E&M-EST. PATIENT-LVL II: ICD-10-PCS | Mod: PBBFAC,,, | Performed by: OBSTETRICS & GYNECOLOGY

## 2023-07-20 PROCEDURE — 1159F PR MEDICATION LIST DOCUMENTED IN MEDICAL RECORD: ICD-10-PCS | Mod: CPTII,,, | Performed by: OBSTETRICS & GYNECOLOGY

## 2023-07-20 PROCEDURE — 3075F PR MOST RECENT SYSTOLIC BLOOD PRESS GE 130-139MM HG: ICD-10-PCS | Mod: CPTII,,, | Performed by: OBSTETRICS & GYNECOLOGY

## 2023-07-20 PROCEDURE — 99212 OFFICE O/P EST SF 10 MIN: CPT | Mod: PBBFAC,TH,25 | Performed by: OBSTETRICS & GYNECOLOGY

## 2023-07-20 PROCEDURE — 99999 PR PBB SHADOW E&M-EST. PATIENT-LVL II: CPT | Mod: PBBFAC,,, | Performed by: OBSTETRICS & GYNECOLOGY

## 2023-07-20 PROCEDURE — 76819 FETAL BIOPHYS PROFIL W/O NST: CPT | Mod: 26,S$PBB,, | Performed by: OBSTETRICS & GYNECOLOGY

## 2023-07-20 PROCEDURE — 1159F MED LIST DOCD IN RCRD: CPT | Mod: CPTII,,, | Performed by: OBSTETRICS & GYNECOLOGY

## 2023-07-20 PROCEDURE — 1160F RVW MEDS BY RX/DR IN RCRD: CPT | Mod: CPTII,,, | Performed by: OBSTETRICS & GYNECOLOGY

## 2023-07-20 PROCEDURE — 3080F DIAST BP >= 90 MM HG: CPT | Mod: CPTII,,, | Performed by: OBSTETRICS & GYNECOLOGY

## 2023-07-20 PROCEDURE — 76819 US MFM PROCEDURE (VIEWPOINT): ICD-10-PCS | Mod: 26,S$PBB,, | Performed by: OBSTETRICS & GYNECOLOGY

## 2023-07-20 PROCEDURE — 76820 US MFM PROCEDURE (VIEWPOINT): ICD-10-PCS | Mod: 26,S$PBB,, | Performed by: OBSTETRICS & GYNECOLOGY

## 2023-07-20 PROCEDURE — 3008F PR BODY MASS INDEX (BMI) DOCUMENTED: ICD-10-PCS | Mod: CPTII,,, | Performed by: OBSTETRICS & GYNECOLOGY

## 2023-07-20 PROCEDURE — 3075F SYST BP GE 130 - 139MM HG: CPT | Mod: CPTII,,, | Performed by: OBSTETRICS & GYNECOLOGY

## 2023-07-20 PROCEDURE — 76816 OB US FOLLOW-UP PER FETUS: CPT | Mod: PBBFAC | Performed by: OBSTETRICS & GYNECOLOGY

## 2023-07-20 PROCEDURE — 76820 UMBILICAL ARTERY ECHO: CPT | Mod: 26,S$PBB,, | Performed by: OBSTETRICS & GYNECOLOGY

## 2023-07-20 PROCEDURE — 3008F BODY MASS INDEX DOCD: CPT | Mod: CPTII,,, | Performed by: OBSTETRICS & GYNECOLOGY

## 2023-07-20 PROCEDURE — 3080F PR MOST RECENT DIASTOLIC BLOOD PRESSURE >= 90 MM HG: ICD-10-PCS | Mod: CPTII,,, | Performed by: OBSTETRICS & GYNECOLOGY

## 2023-07-20 PROCEDURE — 1160F PR REVIEW ALL MEDS BY PRESCRIBER/CLIN PHARMACIST DOCUMENTED: ICD-10-PCS | Mod: CPTII,,, | Performed by: OBSTETRICS & GYNECOLOGY

## 2023-07-20 PROCEDURE — 76816 US MFM PROCEDURE (VIEWPOINT): ICD-10-PCS | Mod: 26,S$PBB,, | Performed by: OBSTETRICS & GYNECOLOGY

## 2023-07-20 PROCEDURE — 99215 OFFICE O/P EST HI 40 MIN: CPT | Mod: 25,S$PBB,TH, | Performed by: OBSTETRICS & GYNECOLOGY

## 2023-07-20 RX ORDER — ACETAMINOPHEN 500 MG
TABLET ORAL NIGHTLY
COMMUNITY

## 2023-07-20 NOTE — ASSESSMENT & PLAN NOTE
Lagging fetal long bones, more pronounced proximally    Today's ultrasound findings are suggestive of a possible fetal skeletal dysplasia with proximal long bones measuring 3-4 standard deviations below the mean while distal long bones lag at 2-3 SD below the mean. A detailed fetal anatomic survey reveals no further additional findings. Pregnancy dating information was reviewed and confirmed.     Maternal medical and family histories were reviewed, as was paternal family history. No/these contributory not factors were identified.    Skeletal dysplasias are a heterogenous group of relatively rare conditions involving generalized abnormal bone growth, with a prevalence of approximately 2.4 per 10,000 births. In general, the earlier the gestational age at which skeletal findings are noted, the more likely the findings are to be associated with a severe or lethal skeletal dysplasia. Other genetic conditions or common aneuploidies, maternal medication/teratogen exposures, and maternal medical illnesses can be associated with shortened long bones or skeletal abnormalities. Some skeletal abnormalities are heritable. Neither maternal nor paternal family histories are suggestive of a heritable skeletal dysplasia. The patient's cfDNA is normal. Findings on US are not concerning for a lethal skeletal dysplasia with normal chest size and shape.    The patient was counseled that the phenotype can vary widely with non-lethal skeletal dysplasias. For example, achondroplasia is a condition characterized by short stature with normal lifespan and intelligence. More severe, non-lethal skeletal dysplasias can be associated with severe chronic medical co-morbidities, abnormal neurologic development, and shortened lifespan.     Based on today's ultrasound findings the differential diagnosis leads with either normal short stature or achondroplasia.    Management:  1. In the absence of concurrent concerns, prenatal mgmt including   testing, growth assessment and delivery timing will be per FGR header  2. Today's findings does not modify mode, timing or location of delivery  3. Counseled that genetic testing may provide information regarding prognosis, as well as recurrence risks for other offspring and family members; recommend  evaluation

## 2023-07-20 NOTE — ASSESSMENT & PLAN NOTE
Today fetal growth restriction was again observed on growth ultrasound, with EFW 5% and AC 6%. As noted in the above header, rhizomelia is appreciated. No fetal structural abnormalities are identified, and the amniotic fluid volume is normal. A biophysical profile was performed, and the score was reassuring at 8/8. Umbilical artery Doppler studies were also performed, and the S/D ratio is normal.   Defined as either EFW or AC <10%, fetal growth restriction is associated with increased  morbidity, including  ICU admission and stillbirth. A variety of etiologies can contribute to suboptimal fetal growth in the third trimester. The majority of growth restriction is attributable to constitutional make-up. However, chromosomal and genetic anomalies, infectious causes and uteroplacental dysfunction can also cause lagging fetal growth. Underlying maternal comorbidities, such as hypertensive disease or autoimmune disorders, increase the risk of restricted fetal growth. Unfortunately, our ability to discern constitutional from pathological is limited, meaning we cast a wide net to hopefully identify all at-risk pregnancies.   Pregnancies diagnosed with fetal growth restriction stratify into an intensive antepartum fetal surveillance that includes twice weekly antepartum fetal surveillance (typically BPP once weekly and NST on another day) and weekly umbilical artery doppler interrogation (typically paired with the BPP). Based on those assessments and degree of growth restriction, delivery is recommended:  - 37w0d - 37w6d: GHTN (see separate header)  - 33w0d - 34w0d: UA Dopplers with Absent End Diastolic Flow  - 30w0d - 32w0d: UA Dopplers with Reversed End Diastolic Flow      Recommendations:  1. Twice weekly antepartum fetal surveillance (typically BPP+UA Dopplers alternating with NST)  2. Weekly umbilical artery doppler interrogation  3. Follow up fetal growth assessment in 3 weeks  4. Delivery timing  based on current findings would be tentatively for 37 weeks, though the battery of testing may speak otherwise

## 2023-07-20 NOTE — PROGRESS NOTES
"Maternal Fetal Medicine follow up consult    SUBJECTIVE:     Nirali Chavez is a 33 y.o.  female with IUP at 33w3d who is seen in follow up consultation by MFM.  Pregnancy complications include:   Problem   Suspected fetal rhizomelia   Gestational Hypertension, Third Trimester   Fetal Growth Restriction Antepartum   URI (Upper Respiratory Infection) (Resolved)   Shingles (Resolved)       Previous notes reviewed.   No changes to medical, surgical, family, social, or obstetric history.    Interval history since last MFM visit: FGR persists today. Reports full smoking cessation, occasionally "trips up."    Medications:  Current Outpatient Medications   Medication Instructions    aspirin (ECOTRIN) 81 mg, Oral, Daily    ergocalciferol, vitamin D2, (VITAMIN D ORAL) Oral, Daily, Gummies    melatonin (MELATIN) 5 mg Oral, Nightly    prenatal vit no.124/iron/folic (PRENATAL VITAMIN ORAL) Oral, Daily    sertraline (ZOLOFT) 100 mg, Oral, Nightly       Care team members:  Dr. Edwards - Primary OB     OBJECTIVE:   BP (!) 136/92 (BP Location: Left arm, Patient Position: Sitting, BP Method: Medium (Automatic))   Ht 5' 3" (1.6 m)   Wt 88.3 kg (194 lb 10.7 oz)   LMP 10/31/2022 (Exact Date)   BMI 34.48 kg/m²     Ultrasound performed. See viewpoint for full ultrasound report.  1. FGR is again identified on today's study. The EFW measures 1639 g, plotting at the 5%, and the AC plots at the 6%.  - Long bone shortening observed today with emerging rhizomelia (proximal long bone shortening) is observed today, with humerus and femur 3-4 SD below expected for EGA, while distal long bones measure 2-3 SD below expected  - No anatomical anomalies are noted  - Chest size and shape appear normal  2. Biophysical profile is normal at 8/8 with normal amount of amniotic fluid.  3. Umbilical artery Doppler S/D ratios are normal at 32% with persistent forward flow.    Significant labs/imaging:  Cell free DNA: low risk    ASSESSMENT/PLAN: "     33 y.o.  female with IUP at 33w3d    Suspected fetal rhizomelia  Lagging fetal long bones, more pronounced proximally    Today's ultrasound findings are suggestive of a possible fetal skeletal dysplasia with proximal long bones measuring 3-4 standard deviations below the mean while distal long bones lag at 2-3 SD below the mean. A detailed fetal anatomic survey reveals no further additional findings. Pregnancy dating information was reviewed and confirmed.     Maternal medical and family histories were reviewed, as was paternal family history. No/these contributory not factors were identified.    Skeletal dysplasias are a heterogenous group of relatively rare conditions involving generalized abnormal bone growth, with a prevalence of approximately 2.4 per 10,000 births. In general, the earlier the gestational age at which skeletal findings are noted, the more likely the findings are to be associated with a severe or lethal skeletal dysplasia. Other genetic conditions or common aneuploidies, maternal medication/teratogen exposures, and maternal medical illnesses can be associated with shortened long bones or skeletal abnormalities. Some skeletal abnormalities are heritable. Neither maternal nor paternal family histories are suggestive of a heritable skeletal dysplasia. The patient's cfDNA is normal. Findings on US are not concerning for a lethal skeletal dysplasia with normal chest size and shape.    The patient was counseled that the phenotype can vary widely with non-lethal skeletal dysplasias. For example, achondroplasia is a condition characterized by short stature with normal lifespan and intelligence. More severe, non-lethal skeletal dysplasias can be associated with severe chronic medical co-morbidities, abnormal neurologic development, and shortened lifespan.     Based on today's ultrasound findings the differential diagnosis leads with either normal short stature or  achondroplasia.    Management:  1. In the absence of concurrent concerns, prenatal mgmt including  testing, growth assessment and delivery timing will be per FGR header  2. Today's findings does not modify mode, timing or location of delivery  3. Counseled that genetic testing may provide information regarding prognosis, as well as recurrence risks for other offspring and family members; recommend  evaluation     Fetal growth restriction antepartum   Today fetal growth restriction was again observed on growth ultrasound, with EFW 5% and AC 6%. As noted in the above header, rhizomelia is appreciated. No fetal structural abnormalities are identified, and the amniotic fluid volume is normal. A biophysical profile was performed, and the score was reassuring at 8/8. Umbilical artery Doppler studies were also performed, and the S/D ratio is normal.   Defined as either EFW or AC <10%, fetal growth restriction is associated with increased  morbidity, including  ICU admission and stillbirth. A variety of etiologies can contribute to suboptimal fetal growth in the third trimester. The majority of growth restriction is attributable to constitutional make-up. However, chromosomal and genetic anomalies, infectious causes and uteroplacental dysfunction can also cause lagging fetal growth. Underlying maternal comorbidities, such as hypertensive disease or autoimmune disorders, increase the risk of restricted fetal growth. Unfortunately, our ability to discern constitutional from pathological is limited, meaning we cast a wide net to hopefully identify all at-risk pregnancies.   Pregnancies diagnosed with fetal growth restriction stratify into an intensive antepartum fetal surveillance that includes twice weekly antepartum fetal surveillance (typically BPP once weekly and NST on another day) and weekly umbilical artery doppler interrogation (typically paired with the BPP). Based on those  assessments and degree of growth restriction, delivery is recommended:  - 37w0d - 37w6d: GHTN (see separate header)  - 33w0d - 34w0d: UA Dopplers with Absent End Diastolic Flow  - 30w0d - 32w0d: UA Dopplers with Reversed End Diastolic Flow      Recommendations:  1. Twice weekly antepartum fetal surveillance (typically BPP+UA Dopplers alternating with NST)  2. Weekly umbilical artery doppler interrogation  3. Follow up fetal growth assessment in 3 weeks  4. Delivery timing based on current findings would be tentatively for 37 weeks, though the battery of testing may speak otherwise    Gestational hypertension, third trimester  Elevated BP today, previous visit additionally with elevated BP.     Gestational HTN is a hypertensive disease of pregnancy that involves new onset mild range BP in the second half of pregnancy without proteinuria or signs of severe disease. As pregnancy associated hypertension can evolve in severity, she remains at risk to develop preeclampsia and severe features in the coming weeks. So long as BPs remain < 160/< 110 and lab/exam/symptoms remain stable, GHTN can be managed expectantly with weekly prenatal visits and twice weekly prenatal testing (BPPs and/or NSTs with weekly fluid checks). We recommend against initiation of antihypertensives, particularly in the outpatient setting; if antihypertensives are needed to maintain BP <160/< 110, she meets criteria for severe disease and should be managed inpatient through delivery. For non-severe pregnancy associated hypertension, delivery timing is 37w. Severe features warrant inpatient management, with planned delivery at 34w or at time of diagnosis if later. Hypertensive disorders of pregnancy increase risks for poor outcomes, in current/future pregnancies, as well as for long term cardiac related mortality. Recurrence can be prevented in future pregnancy with initiation of ASA 81 mg daily at 12 weeks gestation. Prevention of long term  "morbidities, including chronic hypertension and coronary artery disease, will depend on establishing regular care with a primary care provider.    Recommendations:  1. Preeclampsia serum labs and urine protein assessment (primary team to coordinate)  2. Continue expectant outpatient management through 37w unless patient develops:  - Severe HA non-responsive to acetaminophen, visual disturbances, acute onset shortness of breath or RUQ pain  - Persistent SBP > 160 or DBP > 110  - Laboratory evidence of end organ failure, including thrombocytopenia < 100K, elevated Cr (2x baseline levels or > 1.1), elevated transaminases (2x upper limit of normal)  - Other findings suggestive of severe disease such as elevated lactate dehydrogenase or evidence of pulmonary edema  - In such scenarios the target delivery date is 34w  - We generally recommend titration of blood pressure medications after 32w  3. Recommend against the outpatient use of antihypertensives for pregnancy associated hypertension  4. For future pregnancies, recommend initiation of ASA 81 mg daily at 12 weeks  5. Recommend patient establish primary care provider if she has not already done so    Tobacco abuse  Pt reports she has achieved full daily cessation, though occasionally "trips up"    Recommendations:  1. Continued cessation    F/u in 3 weeks for M visit  F/u in 3 weeks for US    Thank you for the opportunity to participate in the care of Nirali. Today I spent 41 minutes in the care of Ms. Chavez. This includes face to face time and non-face to face time preparing to see the patient (eg, review of tests), obtaining and/or reviewing separately obtained history, documenting clinical information in the electronic or other health record, independently interpreting results and communicating results to the patient/family/caregiver, or care coordination.       BRENNAN Fernandes MD/MPH  Maternal Fetal Medicine  "

## 2023-07-20 NOTE — ASSESSMENT & PLAN NOTE
Elevated BP today, previous visit additionally with elevated BP.     Gestational HTN is a hypertensive disease of pregnancy that involves new onset mild range BP in the second half of pregnancy without proteinuria or signs of severe disease. As pregnancy associated hypertension can evolve in severity, she remains at risk to develop preeclampsia and severe features in the coming weeks. So long as BPs remain < 160/< 110 and lab/exam/symptoms remain stable, GHTN can be managed expectantly with weekly prenatal visits and twice weekly prenatal testing (BPPs and/or NSTs with weekly fluid checks). We recommend against initiation of antihypertensives, particularly in the outpatient setting; if antihypertensives are needed to maintain BP <160/< 110, she meets criteria for severe disease and should be managed inpatient through delivery. For non-severe pregnancy associated hypertension, delivery timing is 37w. Severe features warrant inpatient management, with planned delivery at 34w or at time of diagnosis if later. Hypertensive disorders of pregnancy increase risks for poor outcomes, in current/future pregnancies, as well as for long term cardiac related mortality. Recurrence can be prevented in future pregnancy with initiation of ASA 81 mg daily at 12 weeks gestation. Prevention of long term morbidities, including chronic hypertension and coronary artery disease, will depend on establishing regular care with a primary care provider.    Recommendations:  1. Preeclampsia serum labs and urine protein assessment (primary team to coordinate)  2. Continue expectant outpatient management through 37w unless patient develops:  - Severe HA non-responsive to acetaminophen, visual disturbances, acute onset shortness of breath or RUQ pain  - Persistent SBP > 160 or DBP > 110  - Laboratory evidence of end organ failure, including thrombocytopenia < 100K, elevated Cr (2x baseline levels or > 1.1), elevated transaminases (2x upper limit of  normal)  - Other findings suggestive of severe disease such as elevated lactate dehydrogenase or evidence of pulmonary edema  - In such scenarios the target delivery date is 34w  - We generally recommend titration of blood pressure medications after 32w  3. Recommend against the outpatient use of antihypertensives for pregnancy associated hypertension  4. For future pregnancies, recommend initiation of ASA 81 mg daily at 12 weeks  5. Recommend patient establish primary care provider if she has not already done so

## 2023-07-24 ENCOUNTER — HOSPITAL ENCOUNTER (OUTPATIENT)
Facility: HOSPITAL | Age: 33
Discharge: HOME | End: 2023-07-24
Attending: SPECIALIST | Admitting: SPECIALIST
Payer: MEDICAID

## 2023-07-24 VITALS — DIASTOLIC BLOOD PRESSURE: 82 MMHG | SYSTOLIC BLOOD PRESSURE: 146 MMHG | HEART RATE: 78 BPM

## 2023-07-24 DIAGNOSIS — O36.5990 IUGR (INTRAUTERINE GROWTH RESTRICTION) AFFECTING CARE OF MOTHER: ICD-10-CM

## 2023-07-24 PROCEDURE — 59025 FETAL NON-STRESS TEST: CPT

## 2023-07-25 ENCOUNTER — HOSPITAL ENCOUNTER (OUTPATIENT)
Facility: HOSPITAL | Age: 33
Discharge: HOME | End: 2023-07-25
Attending: SPECIALIST | Admitting: SPECIALIST
Payer: MEDICAID

## 2023-07-25 VITALS
HEART RATE: 80 BPM | DIASTOLIC BLOOD PRESSURE: 72 MMHG | WEIGHT: 200 LBS | RESPIRATION RATE: 16 BRPM | SYSTOLIC BLOOD PRESSURE: 128 MMHG | BODY MASS INDEX: 35.44 KG/M2 | HEIGHT: 63 IN

## 2023-07-25 DIAGNOSIS — I10 HYPERTENSION: ICD-10-CM

## 2023-07-25 DIAGNOSIS — O16.9 HYPERTENSION AFFECTING PREGNANCY: Primary | ICD-10-CM

## 2023-07-25 LAB
ALBUMIN SERPL BCP-MCNC: 3.1 G/DL (ref 3.5–5.2)
ALP SERPL-CCNC: 90 U/L (ref 55–135)
ALT SERPL W/O P-5'-P-CCNC: 13 U/L (ref 10–44)
ANION GAP SERPL CALC-SCNC: 7 MMOL/L (ref 8–16)
AST SERPL-CCNC: 13 U/L (ref 10–40)
BASOPHILS # BLD AUTO: 0.04 K/UL (ref 0–0.2)
BASOPHILS NFR BLD: 0.3 % (ref 0–1.9)
BILIRUB SERPL-MCNC: 0.4 MG/DL (ref 0.1–1)
BILIRUB UR QL STRIP: NEGATIVE
BUN SERPL-MCNC: 11 MG/DL (ref 6–20)
CALCIUM SERPL-MCNC: 8.8 MG/DL (ref 8.7–10.5)
CHLORIDE SERPL-SCNC: 109 MMOL/L (ref 95–110)
CLARITY UR: ABNORMAL
CO2 SERPL-SCNC: 20 MMOL/L (ref 23–29)
COLOR UR: YELLOW
CREAT SERPL-MCNC: 0.6 MG/DL (ref 0.5–1.4)
CREAT UR-MCNC: 133 MG/DL (ref 15–325)
DIFFERENTIAL METHOD: ABNORMAL
EOSINOPHIL # BLD AUTO: 0.1 K/UL (ref 0–0.5)
EOSINOPHIL NFR BLD: 0.6 % (ref 0–8)
ERYTHROCYTE [DISTWIDTH] IN BLOOD BY AUTOMATED COUNT: 13.6 % (ref 11.5–14.5)
EST. GFR  (NO RACE VARIABLE): >60 ML/MIN/1.73 M^2
GLUCOSE SERPL-MCNC: 128 MG/DL (ref 70–110)
GLUCOSE UR QL STRIP: NEGATIVE
HCT VFR BLD AUTO: 38.9 % (ref 37–48.5)
HGB BLD-MCNC: 13.5 G/DL (ref 12–16)
HGB UR QL STRIP: NEGATIVE
IMM GRANULOCYTES # BLD AUTO: 0.05 K/UL (ref 0–0.04)
IMM GRANULOCYTES NFR BLD AUTO: 0.4 % (ref 0–0.5)
KETONES UR QL STRIP: NEGATIVE
LEUKOCYTE ESTERASE UR QL STRIP: NEGATIVE
LYMPHOCYTES # BLD AUTO: 2.7 K/UL (ref 1–4.8)
LYMPHOCYTES NFR BLD: 18.8 % (ref 18–48)
MCH RBC QN AUTO: 30.3 PG (ref 27–31)
MCHC RBC AUTO-ENTMCNC: 34.7 G/DL (ref 32–36)
MCV RBC AUTO: 87 FL (ref 82–98)
MONOCYTES # BLD AUTO: 0.8 K/UL (ref 0.3–1)
MONOCYTES NFR BLD: 5.3 % (ref 4–15)
NEUTROPHILS # BLD AUTO: 10.7 K/UL (ref 1.8–7.7)
NEUTROPHILS NFR BLD: 74.6 % (ref 38–73)
NITRITE UR QL STRIP: NEGATIVE
NRBC BLD-RTO: 0 /100 WBC
PH UR STRIP: 8 [PH] (ref 5–8)
PLATELET # BLD AUTO: 288 K/UL (ref 150–450)
PMV BLD AUTO: 11.4 FL (ref 9.2–12.9)
POTASSIUM SERPL-SCNC: 3.5 MMOL/L (ref 3.5–5.1)
PROT SERPL-MCNC: 6.4 G/DL (ref 6–8.4)
PROT UR QL STRIP: ABNORMAL
PROT UR-MCNC: 13 MG/DL (ref 6–15)
PROT/CREAT UR: 0.1 MG/G{CREAT} (ref 0–0.2)
RBC # BLD AUTO: 4.46 M/UL (ref 4–5.4)
SODIUM SERPL-SCNC: 136 MMOL/L (ref 136–145)
SP GR UR STRIP: 1.02 (ref 1–1.03)
URATE SERPL-MCNC: 3.8 MG/DL (ref 2.4–5.7)
URN SPEC COLLECT METH UR: ABNORMAL
UROBILINOGEN UR STRIP-ACNC: NEGATIVE EU/DL
WBC # BLD AUTO: 14.26 K/UL (ref 3.9–12.7)

## 2023-07-25 PROCEDURE — 36415 COLL VENOUS BLD VENIPUNCTURE: CPT | Performed by: SPECIALIST

## 2023-07-25 PROCEDURE — 84156 ASSAY OF PROTEIN URINE: CPT | Performed by: SPECIALIST

## 2023-07-25 PROCEDURE — 63600175 PHARM REV CODE 636 W HCPCS: Performed by: SPECIALIST

## 2023-07-25 PROCEDURE — 85025 COMPLETE CBC W/AUTO DIFF WBC: CPT | Performed by: SPECIALIST

## 2023-07-25 PROCEDURE — 81003 URINALYSIS AUTO W/O SCOPE: CPT | Performed by: SPECIALIST

## 2023-07-25 PROCEDURE — 99211 OFF/OP EST MAY X REQ PHY/QHP: CPT

## 2023-07-25 PROCEDURE — 80053 COMPREHEN METABOLIC PANEL: CPT | Performed by: SPECIALIST

## 2023-07-25 PROCEDURE — 84550 ASSAY OF BLOOD/URIC ACID: CPT | Performed by: SPECIALIST

## 2023-07-25 RX ORDER — BETAMETHASONE SODIUM PHOSPHATE AND BETAMETHASONE ACETATE 3; 3 MG/ML; MG/ML
12 INJECTION, SUSPENSION INTRA-ARTICULAR; INTRALESIONAL; INTRAMUSCULAR; SOFT TISSUE
Status: DISCONTINUED | OUTPATIENT
Start: 2023-07-25 | End: 2023-07-25 | Stop reason: HOSPADM

## 2023-07-25 RX ADMIN — BETAMETHASONE ACETATE AND BETAMETHASONE SODIUM PHOSPHATE 12 MG: 3; 3 INJECTION, SUSPENSION INTRA-ARTICULAR; INTRALESIONAL; INTRAMUSCULAR; SOFT TISSUE at 11:07

## 2023-07-25 NOTE — PLAN OF CARE
Problem: Adult Inpatient Plan of Care  Goal: Readiness for Transition of Care  Outcome: Ongoing, Progressing     Problem:  Fall Injury Risk  Goal: Absence of Fall, Infant Drop and Related Injury  Outcome: Ongoing, Progressing     Problem: Hypertensive Disorders in Pregnancy  Goal: Maternal-Fetal Stabilization  Outcome: Ongoing, Progressing

## 2023-07-25 NOTE — DISCHARGE INSTRUCTIONS
Keep your scheduled appointment with your provider.    Call your Doctor if you have any of the following:  Temperature above 100 degrees  Nausea, vomiting and/or diarrhea  Severe headache, dizziness, or blurred vision  Notable increase in swelling of hands or feet  Notable swelling of face and lips  Difficulty, pain or burning with urination  Foul smelling vaginal discharge  Decreased fetal movement    Come to the hospital if you have any of the following symptoms:  Your water breaks  More than 4-6 contractions in 1 hour for 2 or more hours  Vaginal bleeding like a period    After 28 weeks, you should feel 10 distinct fetal movements within a 2 hour period.    It is recommended that you drink 1/2 a gallon of water each day.  Tea, Soda and Juice are  in addition to this.     Return to hospital for blood pressure check and steroid injection around 11:00 7/26/23

## 2023-07-25 NOTE — NURSING
[facility]  Department of Obstetrics and Gynecology  Labor & Delivery Triage Assessment    PATIENT NAME: Nirali Chavez  MRN: [id number]  TODAY'S DATE: 2023    CHIEF COMPLAINT: Hypertension      OB History    Para Term  AB Living   2 1 1 0 0 1   SAB IAB Ectopic Multiple Live Births   0 0 0 0 1      # Outcome Date GA Lbr Jose/2nd Weight Sex Delivery Anes PTL Lv   2 Current            1 Term 16 37w5d  3.459 kg (7 lb 10 oz) M Vag-Spont  N LAURA      Complications: Pre-eclampsia     Past Medical History:   Diagnosis Date    ADHD (attention deficit hyperactivity disorder)     Depression     Hypertension     Thyroid condition      Past Surgical History:   Procedure Laterality Date    TONSILLECTOMY           VITAL SIGNS - ABNORMAL VITALS INCLUDE TEMP >100.4,RR <12 or >26, SUSTAINED MATERNAL PULSE <60 or >120     VITAL SIGNS (Most Recent)  Pulse: 80 (23 1259)  Resp: 16 (23 1149)  BP: 128/72 (23 1259)    VITAL SIGNS     normal  HEADACHE    yes     VOMITING    no  VISUAL DISTURBANCES  no  EPIGASTRIC PAIN        no  PROTEINURIA 2+ or MORE             no   EDEMA FACE/EXTREMITIES            no    FETAL MOVEMENT     FETAL MOVEMENT: Active  FETAL HEART RATE BASELINE =  145  normal  FETAL HEART RATE VARIABILITY:  Moderate  FETAL HEART RATE ACCELERATIONS FOR GESTATIONAL AGE: present  FETAL HEART RATE DECELERATIONS: none    ABDOMINAL PAIN/CRAMPING/CONTRACTIONS     Patient is not complaining of abdominal pain/cramping/contractions.    RUPTURE OF MEMBRANES OR LEAKING OF AMNIOTIC FLUID     Patient denies ROM or leaking of amniotic fluid.    VAGINAL BLEEDING     Patient denies vaginal bleeding.    VAGINAL EXAM     VAGINAL EXAM DEFERRED DUE TO:  Not in Labor    PAIN PRESENT ON ARRIVAL     none  Interventions     Presents from office for elevated bp. Betamethsone 1st dose given. Serial BP obtained. CFM. Return 23 at 1100 for second dose betamethsone and serial BP      PATIENT  DISPOSITION     Discharged Home      Dr. Edwards notified at 1330 of the above assessment.    Niesha Castro RN  [facility]  07/25/2023

## 2023-07-26 ENCOUNTER — HOSPITAL ENCOUNTER (OUTPATIENT)
Facility: HOSPITAL | Age: 33
Discharge: HOME | End: 2023-07-26
Attending: SPECIALIST | Admitting: SPECIALIST
Payer: MEDICAID

## 2023-07-26 VITALS — HEART RATE: 70 BPM | DIASTOLIC BLOOD PRESSURE: 70 MMHG | SYSTOLIC BLOOD PRESSURE: 131 MMHG

## 2023-07-26 DIAGNOSIS — O16.9 HYPERTENSION AFFECTING PREGNANCY: ICD-10-CM

## 2023-07-26 PROCEDURE — 63600175 PHARM REV CODE 636 W HCPCS: Performed by: SPECIALIST

## 2023-07-26 RX ORDER — BETAMETHASONE SODIUM PHOSPHATE AND BETAMETHASONE ACETATE 3; 3 MG/ML; MG/ML
12 INJECTION, SUSPENSION INTRA-ARTICULAR; INTRALESIONAL; INTRAMUSCULAR; SOFT TISSUE ONCE
Status: COMPLETED | OUTPATIENT
Start: 2023-07-26 | End: 2023-07-26

## 2023-07-26 RX ADMIN — BETAMETHASONE ACETATE AND BETAMETHASONE SODIUM PHOSPHATE 12 MG: 3; 3 INJECTION, SUSPENSION INTRA-ARTICULAR; INTRALESIONAL; INTRAMUSCULAR; SOFT TISSUE at 01:07

## 2023-07-27 ENCOUNTER — HOSPITAL ENCOUNTER (OUTPATIENT)
Facility: HOSPITAL | Age: 33
Discharge: HOME | End: 2023-07-27
Attending: SPECIALIST | Admitting: SPECIALIST
Payer: MEDICAID

## 2023-07-27 ENCOUNTER — HOSPITAL ENCOUNTER (OUTPATIENT)
Dept: PERINATAL CARE | Facility: OTHER | Age: 33
Discharge: HOME | End: 2023-07-27
Attending: OBSTETRICS & GYNECOLOGY
Payer: MEDICAID

## 2023-07-27 VITALS — SYSTOLIC BLOOD PRESSURE: 135 MMHG | HEART RATE: 78 BPM | DIASTOLIC BLOOD PRESSURE: 63 MMHG

## 2023-07-27 DIAGNOSIS — O28.5 ABNORMAL CHROMOSOMAL AND GENETIC FINDING ON ANTENATAL SCREENING MOTHER: ICD-10-CM

## 2023-07-27 DIAGNOSIS — O16.3 ELEVATED BLOOD PRESSURE AFFECTING PREGNANCY IN THIRD TRIMESTER, ANTEPARTUM: ICD-10-CM

## 2023-07-27 LAB
BACTERIA #/AREA URNS HPF: NEGATIVE /HPF
BILIRUB UR QL STRIP: NEGATIVE
CLARITY UR: ABNORMAL
COLOR UR: YELLOW
GLUCOSE UR QL STRIP: NEGATIVE
HGB UR QL STRIP: NEGATIVE
HYALINE CASTS #/AREA URNS LPF: 3 /LPF
KETONES UR QL STRIP: NEGATIVE
LEUKOCYTE ESTERASE UR QL STRIP: ABNORMAL
MICROSCOPIC COMMENT: ABNORMAL
NITRITE UR QL STRIP: NEGATIVE
PH UR STRIP: 8 [PH] (ref 5–8)
PROT UR QL STRIP: NEGATIVE
RBC #/AREA URNS HPF: 1 /HPF (ref 0–4)
SP GR UR STRIP: 1.01 (ref 1–1.03)
SQUAMOUS #/AREA URNS HPF: 7 /HPF
URN SPEC COLLECT METH UR: ABNORMAL
UROBILINOGEN UR STRIP-ACNC: NEGATIVE EU/DL
WBC #/AREA URNS HPF: 5 /HPF (ref 0–5)

## 2023-07-27 PROCEDURE — 76819 FETAL BIOPHYS PROFIL W/O NST: CPT | Mod: 26,,, | Performed by: OBSTETRICS & GYNECOLOGY

## 2023-07-27 PROCEDURE — 76820 PRENATAL TESTING - BPP: ICD-10-PCS | Mod: 26,,, | Performed by: OBSTETRICS & GYNECOLOGY

## 2023-07-27 PROCEDURE — 76820 UMBILICAL ARTERY ECHO: CPT | Mod: 26,,, | Performed by: OBSTETRICS & GYNECOLOGY

## 2023-07-27 PROCEDURE — 76819 PRENATAL TESTING - BPP: ICD-10-PCS | Mod: 26,,, | Performed by: OBSTETRICS & GYNECOLOGY

## 2023-07-27 PROCEDURE — 81001 URINALYSIS AUTO W/SCOPE: CPT | Performed by: SPECIALIST

## 2023-07-27 PROCEDURE — 76819 FETAL BIOPHYS PROFIL W/O NST: CPT

## 2023-07-27 PROCEDURE — 76820 UMBILICAL ARTERY ECHO: CPT

## 2023-07-27 PROCEDURE — 99211 OFF/OP EST MAY X REQ PHY/QHP: CPT

## 2023-07-27 NOTE — PROGRESS NOTES
[facility]   Labor and Delivery Triage    SUBJECTIVE:     Patient Info:  Nirali Chavez         33 y.o.    female    1990     Chief Complaint/Reason for Admission: Elevated BP in the office    Triage Assessment:  Pt presents to [facility] L&D unit with c/o elevated blood pressures in the office today. EFM applied. Abdomen soft, nontender. +FM per pt. -vaginal bleeding. Denies H/A, vision changes. She did smoke shortly before coming into labor and delivery for evaluation.  POC discussed, V/U.       OB History:   OB History    : 2  Para: 1  Term: 1  Livin  Live Births: 1              Estimated Date of Delivery: 23     Gestational Age:  34w3d    Allergies:  Review of patient's allergies indicates:  No Known Allergies      OBJECTIVE:     Recent Vitals:  Blood Pressure 135/63   Pulse 78   Last Menstrual Period 10/31/2022 (Exact Date)     Exam:    Deferred. Not in labor      Lab Results:  Recent Results (from the past 36 hour(s))   Urinalysis, Reflex to Urine Culture Urine, Clean Catch    Collection Time: 23  1:53 PM    Specimen: Urine   Result Value Ref Range    Specimen UA Urine, Clean Catch     Color, UA Yellow Yellow, Straw, Samira    Appearance, UA Hazy (A) Clear    pH, UA 8.0 5.0 - 8.0    Specific Gravity, UA 1.010 1.005 - 1.030    Protein, UA Negative Negative    Glucose, UA Negative Negative    Ketones, UA Negative Negative    Bilirubin (UA) Negative Negative    Occult Blood UA Negative Negative    Nitrite, UA Negative Negative    Urobilinogen, UA Negative Negative EU/dL    Leukocytes, UA 1+ (A) Negative   Urinalysis Microscopic    Collection Time: 23  1:53 PM   Result Value Ref Range    RBC, UA 1 0 - 4 /hpf    WBC, UA 5 0 - 5 /hpf    Bacteria Negative None-Occ /hpf    Squam Epithel, UA 7 /hpf    Hyaline Casts, UA 3 (A) 0-1/lpf /lpf    Microscopic Comment SEE COMMENT      No results found for: GROUPTR       Diagnostic Studies:    Baseline: 135 bpm, Variability: Good {> 6  bpm), Accelerations: Reactive, and Decelerations: Absent        COMMUNICATION WITH PROVIDER:     Dr. Edwards notified of initial blood pressure on arrival 140/80 but serial blood pressures after 130's / 60-70's. No proteinuria, +1 leuks, and 3 hyaline casts with negative nitrites. She has ordered her home BP machine and it should be in on Monday. Plan of care was discussed and she agreed to come back to labor and delivery tomorrow for NST and BP check, but mostly BP check per Dr. Edwards's request. Precautions discussed. Verbalized understanding. Discharged home.

## 2023-07-27 NOTE — DISCHARGE INSTRUCTIONS
[address] [place] [address]  Day 3 Interdisciplinary Treatment Plan NOTE    Review Date & Time: 11/3/22 0900    Admission Type:   Admission Type: Voluntary    Reason for admission:  Reason for Admission: Reports having suicidal and homicidal ideations.  Threatening while in the emergency department  Estimated Length of Stay: 5-7 days  Estimated Discharge Date Update: to be determined by physician    PATIENT STRENGTHS:  Patient Strengths:   Patient Strengths and Limitations:Limitations: Difficult relationships / poor social skills, Difficulty problem solving/relies on others to help solve problems  Addictive Behavior:Addictive Behavior  In the Past 3 Months, Have You Felt or Has Someone Told You That You Have a Problem With  : None  Medical Problems:  Past Medical History:   Diagnosis Date    Anxiety     Bipolar 1 disorder ([facility] 1999    Depression     Gestational diabetes 7/22/2016    OCD (obsessive compulsive disorder) 1999    PTSD (post-traumatic stress disorder)     Rapid rate of speech     Schizoaffective disorder ([facility]        Risk:  Fall Risk   Jairo Scale Jairo Scale Score: 22  BVC    Change in scores: No Changes to plan of Care: No    Status EXAM:   Mental Status and Behavioral Exam  Normal: No  Level of Assistance: Independent/Self  Facial Expression: Worried  Affect: Appropriate  Level of Consciousness: Alert  Frequency of Checks: 4 times per hour, close  Mood:Normal: No  Mood: Anxious  Motor Activity:Normal: No  Motor Activity: Increased  Eye Contact: Fair  Observed Behavior: Friendly, Cooperative, Impulsive, Preoccupied  Sexual Misconduct History: Current - no  Involved In Any Sexual Misconduct With Others? : No  History of Sexually Inappropriate Behavior When Previously Hospitalized?: No  Uncontrollable/Compulsive Masturbation?: No  Difficulty Controlling Sexual Impulses?: No  Preception: [place] to person, [place] to time  Attention:Normal: No  Attention: Distractible, Unable to concentrate  Thought Keep your scheduled appointment with your provider.    Call your Doctor if you have any of the following:  Temperature above 100 degrees  Nausea, vomiting and/or diarrhea  Severe headache unrelieved by over the counter Tylenol  Dizziness, or blurred vision  Notable increase in swelling of hands or feet  Notable swelling of face and lips  Difficulty, pain or burning with urination  Foul smelling vaginal discharge  Decreased fetal movement      Come to the hospital if you have any of the following symptoms:  Your water breaks  More than 4-6 contractions in 1 hour for 2 or more hours prior to 36 weeks  Vaginal bleeding like a period    After 28 weeks, you should feel 10 distinct fetal movements within a 2 hour period.    It is recommended that you drink 1/2 a gallon of water each day.  Tea, Soda and Juice are  in addition to this.    Processes: Loose association  Thought Content:Normal: No  Thought Content: Preoccupations  Depression Symptoms: Crying, Sleep disturbance, Loss of interest, Isolative, Impaired concentration, Feelings of hopelessess  Anxiety Symptoms: Generalized  Micaela Symptoms: Poor judgment  Hallucinations: None  Delusions: No  Delusions: Obsessions, Grandeur, Persecutory, Paranoid  Memory:Normal: No  Memory: Poor recent, Poor remote  Insight and Judgment: No  Insight and Judgment: Poor judgment, Poor insight, Unmotivated    Daily Assessment Last Entry:   Daily Sleep (WDL): Exceptions to WDL            Daily Nutrition (WDL): Within Defined Limits  Level of Assistance: Independent/Self    Patient Monitoring:  Frequency of Checks: 4 times per hour, close    Psychiatric Symptoms:   Depression Symptoms  Depression Symptoms: Crying, Sleep disturbance, Loss of interest, Isolative, Impaired concentration, Feelings of hopelessess  Anxiety Symptoms  Anxiety Symptoms: Generalized  Micaela Symptoms  Micaela Symptoms: Poor judgment          Suicide Risk CSSR-S:  1) Within the past month, have you wished you were dead or wished you could go to sleep and not wake up? : No  2) Have you actually had any thoughts of killing yourself? : No  6) Have you ever done anything, started to do anything, or prepared to do anything to end your life?: No  Change in Result: no Change in Plan of care: no      EDUCATION:   Learner Progress Toward Treatment Goals: Reviewed results and recommendations of this team, Reviewed group plan and strategies, Reviewed signs, symptoms and risk of self harm and violent behavior, Reviewed goals and plan of care    Method: small group, individual verbal education    Outcome: Verbalized by patient but needs reinforcement to obtain goals    PATIENT GOALS:  Short term: Improved sleep  Long term: Medication and financial stability    PLAN/TREATMENT RECOMMENDATIONS UPDATE: continue with group therapies, increased socialization, continue planning for after discharge goals, continue with medication compliance    SHORT-TERM GOALS UPDATE:   Time frame for Short-Term Goals: 5-7 days    LONG-TERM GOALS UPDATE:   Time frame for Long-Term Goals: 6 months    Members Present in Team Meeting:   See signature sheet     Ricki Gorman RN

## 2023-07-28 ENCOUNTER — HOSPITAL ENCOUNTER (OUTPATIENT)
Facility: HOSPITAL | Age: 33
Discharge: HOME | End: 2023-07-28
Attending: SPECIALIST | Admitting: SPECIALIST
Payer: MEDICAID

## 2023-07-28 VITALS — HEART RATE: 73 BPM | SYSTOLIC BLOOD PRESSURE: 141 MMHG | DIASTOLIC BLOOD PRESSURE: 81 MMHG

## 2023-07-28 DIAGNOSIS — O16.9 HYPERTENSION AFFECTING PREGNANCY: ICD-10-CM

## 2023-07-28 PROCEDURE — 59025 FETAL NON-STRESS TEST: CPT

## 2023-07-28 NOTE — NURSING
Pt here for NST for HTN. -150's/80-90. NST reactive. RX for 100mg labetolol po bid called in to [facility] pharmacy per Dr Edwards's orders by Flor Newton RN. Pt notified of rx being called in prior to discharge. Pt to come back tomorrow afternoon for repeat NST and BP check.

## 2023-07-29 ENCOUNTER — HOSPITAL ENCOUNTER (OUTPATIENT)
Facility: HOSPITAL | Age: 33
Discharge: HOME | End: 2023-07-29
Attending: SPECIALIST | Admitting: SPECIALIST
Payer: MEDICAID

## 2023-07-29 VITALS — RESPIRATION RATE: 16 BRPM | DIASTOLIC BLOOD PRESSURE: 65 MMHG | SYSTOLIC BLOOD PRESSURE: 126 MMHG | HEART RATE: 90 BPM

## 2023-07-29 DIAGNOSIS — O16.9 HYPERTENSION AFFECTING PREGNANCY: ICD-10-CM

## 2023-07-29 PROCEDURE — 59025 FETAL NON-STRESS TEST: CPT

## 2023-07-29 RX ORDER — LABETALOL 100 MG/1
100 TABLET, FILM COATED ORAL 2 TIMES DAILY
COMMUNITY
Start: 2023-07-28

## 2023-08-03 ENCOUNTER — HOSPITAL ENCOUNTER (OUTPATIENT)
Dept: PERINATAL CARE | Facility: OTHER | Age: 33
Discharge: HOME | End: 2023-08-03
Attending: OBSTETRICS & GYNECOLOGY
Payer: MEDICAID

## 2023-08-03 DIAGNOSIS — O28.5 ABNORMAL CHROMOSOMAL AND GENETIC FINDING ON ANTENATAL SCREENING MOTHER: ICD-10-CM

## 2023-08-03 PROCEDURE — 76819 FETAL BIOPHYS PROFIL W/O NST: CPT

## 2023-08-03 PROCEDURE — 76820 UMBILICAL ARTERY ECHO: CPT | Mod: 26,,, | Performed by: OBSTETRICS & GYNECOLOGY

## 2023-08-03 PROCEDURE — 76819 FETAL BIOPHYS PROFIL W/O NST: CPT | Mod: 26,,, | Performed by: OBSTETRICS & GYNECOLOGY

## 2023-08-03 PROCEDURE — 76820 PRENATAL TESTING - BPP: ICD-10-PCS | Mod: 26,,, | Performed by: OBSTETRICS & GYNECOLOGY

## 2023-08-03 PROCEDURE — 76820 UMBILICAL ARTERY ECHO: CPT

## 2023-08-03 PROCEDURE — 76819 PRENATAL TESTING - BPP: ICD-10-PCS | Mod: 26,,, | Performed by: OBSTETRICS & GYNECOLOGY

## 2023-08-04 ENCOUNTER — HOSPITAL ENCOUNTER (OUTPATIENT)
Facility: HOSPITAL | Age: 33
Discharge: HOME | End: 2023-08-05
Attending: SPECIALIST | Admitting: OBSTETRICS & GYNECOLOGY
Payer: MEDICAID

## 2023-08-04 DIAGNOSIS — R10.9 ABDOMINAL PAIN: ICD-10-CM

## 2023-08-04 PROCEDURE — 81001 URINALYSIS AUTO W/SCOPE: CPT | Performed by: SPECIALIST

## 2023-08-05 LAB
BACTERIA #/AREA URNS HPF: NEGATIVE /HPF
BILIRUB UR QL STRIP: NEGATIVE
CLARITY UR: CLEAR
COLOR UR: YELLOW
GLUCOSE UR QL STRIP: NEGATIVE
HGB UR QL STRIP: NEGATIVE
HYALINE CASTS #/AREA URNS LPF: 16 /LPF
KETONES UR QL STRIP: NEGATIVE
LEUKOCYTE ESTERASE UR QL STRIP: NEGATIVE
MICROSCOPIC COMMENT: ABNORMAL
NITRITE UR QL STRIP: NEGATIVE
PH UR STRIP: 7 [PH] (ref 5–8)
PROT UR QL STRIP: ABNORMAL
RBC #/AREA URNS HPF: 1 /HPF (ref 0–4)
SP GR UR STRIP: >1.03 (ref 1–1.03)
SQUAMOUS #/AREA URNS HPF: 8 /HPF
URN SPEC COLLECT METH UR: ABNORMAL
UROBILINOGEN UR STRIP-ACNC: ABNORMAL EU/DL
WBC #/AREA URNS HPF: 3 /HPF (ref 0–5)

## 2023-08-05 PROCEDURE — 25000003 PHARM REV CODE 250: Performed by: OBSTETRICS & GYNECOLOGY

## 2023-08-05 PROCEDURE — 99211 OFF/OP EST MAY X REQ PHY/QHP: CPT | Mod: 25

## 2023-08-05 RX ORDER — SODIUM CHLORIDE, SODIUM LACTATE, POTASSIUM CHLORIDE, CALCIUM CHLORIDE 600; 310; 30; 20 MG/100ML; MG/100ML; MG/100ML; MG/100ML
INJECTION, SOLUTION INTRAVENOUS CONTINUOUS
Status: DISCONTINUED | OUTPATIENT
Start: 2023-08-05 | End: 2023-08-05

## 2023-08-05 RX ORDER — ACETAMINOPHEN 500 MG
1000 TABLET ORAL ONCE
Status: COMPLETED | OUTPATIENT
Start: 2023-08-05 | End: 2023-08-05

## 2023-08-05 RX ADMIN — ACETAMINOPHEN 1000 MG: 500 TABLET ORAL at 01:08

## 2023-08-05 NOTE — NURSING
2300: Pt arrived to unit ambulatory from home with complaints of lower abdominal pain, cramping, and diarrhea since earlier today. Denies vaginal bleeding, denies leaking fluid, notes decreased fetal movement today. Educated on importance of and how to measure fetal kick counts. EFM x 2 applied to soft non-tender abdomen.  Button given for fetal movement. CCUA collected and sent to lab. NSTs for IUGR and hx pre E. Takes 100mg Labetalol BID, took Benadryl 50mg po at 2230 prior to coming to unit.   0042: Dr. Wild notified of pt arrival to unit, report given, Bps reviewed, UA results reviewed, EFM reviewed, orders rec'd for SVE and BPP.  0048: SVE closed thick and high, Dr. Wild notified, orders rec'd to report back with BPP results.   0055: Ultrasound at bedside for BPP.   0107: Dr. Wild notified of BPP results, notified pt states she would like to try Tylenol for pain. Orders rec'd for Tylenol and discharge home.   0135: Tylenol administered, pt states that she feels reassured and comfortable to go home. Discharge instructions reviewed, pt verbalizes understanding. Discharged off unit ambulatory in no apparent distress.

## 2023-08-09 ENCOUNTER — PATIENT MESSAGE (OUTPATIENT)
Dept: MATERNAL FETAL MEDICINE | Facility: CLINIC | Age: 33
End: 2023-08-09
Payer: MEDICAID

## 2023-08-10 ENCOUNTER — OFFICE VISIT (OUTPATIENT)
Dept: MATERNAL FETAL MEDICINE | Facility: CLINIC | Age: 33
End: 2023-08-10
Payer: MEDICAID

## 2023-08-10 ENCOUNTER — HOSPITAL ENCOUNTER (INPATIENT)
Facility: HOSPITAL | Age: 33
LOS: 5 days | Discharge: HOME | End: 2023-08-15
Attending: SPECIALIST | Admitting: SPECIALIST
Payer: MEDICAID

## 2023-08-10 ENCOUNTER — PROCEDURE VISIT (OUTPATIENT)
Dept: MATERNAL FETAL MEDICINE | Facility: CLINIC | Age: 33
End: 2023-08-10
Payer: MEDICAID

## 2023-08-10 VITALS
HEIGHT: 63 IN | BODY MASS INDEX: 36.09 KG/M2 | SYSTOLIC BLOOD PRESSURE: 147 MMHG | WEIGHT: 203.69 LBS | DIASTOLIC BLOOD PRESSURE: 74 MMHG

## 2023-08-10 DIAGNOSIS — O13.3 GESTATIONAL HYPERTENSION, THIRD TRIMESTER: ICD-10-CM

## 2023-08-10 DIAGNOSIS — Z36.89 ENCOUNTER FOR ULTRASOUND TO ASSESS FETAL GROWTH: ICD-10-CM

## 2023-08-10 DIAGNOSIS — O36.5990 IUGR (INTRAUTERINE GROWTH RESTRICTION) AFFECTING CARE OF MOTHER: Primary | ICD-10-CM

## 2023-08-10 DIAGNOSIS — O35.HXX9: ICD-10-CM

## 2023-08-10 DIAGNOSIS — O36.5990 FETAL GROWTH RESTRICTION ANTEPARTUM: Primary | ICD-10-CM

## 2023-08-10 DIAGNOSIS — O36.5930 POOR FETAL GROWTH AFFECTING MANAGEMENT OF MOTHER IN THIRD TRIMESTER, SINGLE OR UNSPECIFIED FETUS: ICD-10-CM

## 2023-08-10 DIAGNOSIS — O36.5990 FETAL GROWTH RESTRICTION ANTEPARTUM: ICD-10-CM

## 2023-08-10 DIAGNOSIS — Z72.0 TOBACCO ABUSE: ICD-10-CM

## 2023-08-10 DIAGNOSIS — O36.5990 IUGR (INTRAUTERINE GROWTH RESTRICTION) AFFECTING CARE OF MOTHER: ICD-10-CM

## 2023-08-10 DIAGNOSIS — Z34.90 ENCOUNTER FOR PLANNED INDUCTION OF LABOR: Primary | ICD-10-CM

## 2023-08-10 LAB
ABO + RH BLD: NORMAL
ALBUMIN SERPL BCP-MCNC: 3.1 G/DL (ref 3.5–5.2)
ALP SERPL-CCNC: 100 U/L (ref 55–135)
ALT SERPL W/O P-5'-P-CCNC: 17 U/L (ref 10–44)
AMPHET+METHAMPHET UR QL: NEGATIVE
ANION GAP SERPL CALC-SCNC: 8 MMOL/L (ref 8–16)
AST SERPL-CCNC: 22 U/L (ref 10–40)
BARBITURATES UR QL SCN>200 NG/ML: NEGATIVE
BASOPHILS # BLD AUTO: 0.03 K/UL (ref 0–0.2)
BASOPHILS NFR BLD: 0.3 % (ref 0–1.9)
BENZODIAZ UR QL SCN>200 NG/ML: NEGATIVE
BILIRUB SERPL-MCNC: 0.5 MG/DL (ref 0.1–1)
BILIRUB UR QL STRIP: NEGATIVE
BLD GP AB SCN CELLS X3 SERPL QL: NORMAL
BUN SERPL-MCNC: 8 MG/DL (ref 6–20)
BUPRENORPHINE UR QL: NEGATIVE
BZE UR QL SCN: NEGATIVE
CALCIUM SERPL-MCNC: 9.3 MG/DL (ref 8.7–10.5)
CANNABINOIDS UR QL SCN: NEGATIVE
CHLORIDE SERPL-SCNC: 103 MMOL/L (ref 95–110)
CLARITY UR: ABNORMAL
CO2 SERPL-SCNC: 24 MMOL/L (ref 23–29)
COLOR UR: YELLOW
CREAT SERPL-MCNC: 0.6 MG/DL (ref 0.5–1.4)
CREAT UR-MCNC: 161 MG/DL (ref 15–325)
DIFFERENTIAL METHOD: NORMAL
EOSINOPHIL # BLD AUTO: 0.1 K/UL (ref 0–0.5)
EOSINOPHIL NFR BLD: 0.4 % (ref 0–8)
ERYTHROCYTE [DISTWIDTH] IN BLOOD BY AUTOMATED COUNT: 13.6 % (ref 11.5–14.5)
EST. GFR  (NO RACE VARIABLE): >60 ML/MIN/1.73 M^2
GLUCOSE SERPL-MCNC: 84 MG/DL (ref 70–110)
GLUCOSE UR QL STRIP: NEGATIVE
HCT VFR BLD AUTO: 37 % (ref 37–48.5)
HGB BLD-MCNC: 12.9 G/DL (ref 12–16)
HGB UR QL STRIP: NEGATIVE
IMM GRANULOCYTES # BLD AUTO: 0.02 K/UL (ref 0–0.04)
IMM GRANULOCYTES NFR BLD AUTO: 0.2 % (ref 0–0.5)
KETONES UR QL STRIP: NEGATIVE
LEUKOCYTE ESTERASE UR QL STRIP: NEGATIVE
LYMPHOCYTES # BLD AUTO: 2.7 K/UL (ref 1–4.8)
LYMPHOCYTES NFR BLD: 23.9 % (ref 18–48)
MCH RBC QN AUTO: 30.4 PG (ref 27–31)
MCHC RBC AUTO-ENTMCNC: 34.9 G/DL (ref 32–36)
MCV RBC AUTO: 87 FL (ref 82–98)
MONOCYTES # BLD AUTO: 0.8 K/UL (ref 0.3–1)
MONOCYTES NFR BLD: 6.8 % (ref 4–15)
NEUTROPHILS # BLD AUTO: 7.7 K/UL (ref 1.8–7.7)
NEUTROPHILS NFR BLD: 68.4 % (ref 38–73)
NITRITE UR QL STRIP: NEGATIVE
NRBC BLD-RTO: 0 /100 WBC
OPIATES UR QL SCN: NEGATIVE
PCP UR QL SCN>25 NG/ML: NEGATIVE
PH UR STRIP: 7 [PH] (ref 5–8)
PLATELET # BLD AUTO: 231 K/UL (ref 150–450)
PMV BLD AUTO: 11.1 FL (ref 9.2–12.9)
POTASSIUM SERPL-SCNC: 3.7 MMOL/L (ref 3.5–5.1)
PROT SERPL-MCNC: 6.3 G/DL (ref 6–8.4)
PROT UR QL STRIP: ABNORMAL
RBC # BLD AUTO: 4.25 M/UL (ref 4–5.4)
SODIUM SERPL-SCNC: 135 MMOL/L (ref 136–145)
SP GR UR STRIP: 1.01 (ref 1–1.03)
TOXICOLOGY INFORMATION: NORMAL
URATE SERPL-MCNC: 5.4 MG/DL (ref 2.4–5.7)
URN SPEC COLLECT METH UR: ABNORMAL
UROBILINOGEN UR STRIP-ACNC: NEGATIVE EU/DL
WBC # BLD AUTO: 11.21 K/UL (ref 3.9–12.7)

## 2023-08-10 PROCEDURE — 80354 DRUG SCREENING FENTANYL: CPT | Performed by: SPECIALIST

## 2023-08-10 PROCEDURE — 3008F BODY MASS INDEX DOCD: CPT | Mod: CPTII,,, | Performed by: OBSTETRICS & GYNECOLOGY

## 2023-08-10 PROCEDURE — 99999 PR PBB SHADOW E&M-EST. PATIENT-LVL III: ICD-10-PCS | Mod: PBBFAC,,, | Performed by: OBSTETRICS & GYNECOLOGY

## 2023-08-10 PROCEDURE — 80053 COMPREHEN METABOLIC PANEL: CPT | Performed by: SPECIALIST

## 2023-08-10 PROCEDURE — 12000002 HC ACUTE/MED SURGE SEMI-PRIVATE ROOM

## 2023-08-10 PROCEDURE — 76816 OB US FOLLOW-UP PER FETUS: CPT | Mod: PBBFAC | Performed by: OBSTETRICS & GYNECOLOGY

## 2023-08-10 PROCEDURE — 99213 OFFICE O/P EST LOW 20 MIN: CPT | Mod: PBBFAC,TH,25 | Performed by: OBSTETRICS & GYNECOLOGY

## 2023-08-10 PROCEDURE — 80307 DRUG TEST PRSMV CHEM ANLYZR: CPT | Performed by: SPECIALIST

## 2023-08-10 PROCEDURE — 84550 ASSAY OF BLOOD/URIC ACID: CPT | Performed by: SPECIALIST

## 2023-08-10 PROCEDURE — 25000003 PHARM REV CODE 250: Performed by: SPECIALIST

## 2023-08-10 PROCEDURE — 3078F PR MOST RECENT DIASTOLIC BLOOD PRESSURE < 80 MM HG: ICD-10-PCS | Mod: CPTII,,, | Performed by: OBSTETRICS & GYNECOLOGY

## 2023-08-10 PROCEDURE — 86592 SYPHILIS TEST NON-TREP QUAL: CPT | Performed by: SPECIALIST

## 2023-08-10 PROCEDURE — 30000890 LABCORP MISCELLANEOUS TEST: Performed by: SPECIALIST

## 2023-08-10 PROCEDURE — 3078F DIAST BP <80 MM HG: CPT | Mod: CPTII,,, | Performed by: OBSTETRICS & GYNECOLOGY

## 2023-08-10 PROCEDURE — 3077F PR MOST RECENT SYSTOLIC BLOOD PRESSURE >= 140 MM HG: ICD-10-PCS | Mod: CPTII,,, | Performed by: OBSTETRICS & GYNECOLOGY

## 2023-08-10 PROCEDURE — 76820 US MFM PROCEDURE (VIEWPOINT): ICD-10-PCS | Mod: 26,S$PBB,, | Performed by: OBSTETRICS & GYNECOLOGY

## 2023-08-10 PROCEDURE — 81003 URINALYSIS AUTO W/O SCOPE: CPT | Mod: 59 | Performed by: SPECIALIST

## 2023-08-10 PROCEDURE — 86900 BLOOD TYPING SEROLOGIC ABO: CPT | Performed by: SPECIALIST

## 2023-08-10 PROCEDURE — 36415 COLL VENOUS BLD VENIPUNCTURE: CPT | Performed by: SPECIALIST

## 2023-08-10 PROCEDURE — 99999 PR PBB SHADOW E&M-EST. PATIENT-LVL III: CPT | Mod: PBBFAC,,, | Performed by: OBSTETRICS & GYNECOLOGY

## 2023-08-10 PROCEDURE — 76816 US MFM PROCEDURE (VIEWPOINT): ICD-10-PCS | Mod: 26,S$PBB,, | Performed by: OBSTETRICS & GYNECOLOGY

## 2023-08-10 PROCEDURE — 85025 COMPLETE CBC W/AUTO DIFF WBC: CPT | Performed by: SPECIALIST

## 2023-08-10 PROCEDURE — 76820 UMBILICAL ARTERY ECHO: CPT | Mod: 26,S$PBB,, | Performed by: OBSTETRICS & GYNECOLOGY

## 2023-08-10 PROCEDURE — 1159F MED LIST DOCD IN RCRD: CPT | Mod: CPTII,,, | Performed by: OBSTETRICS & GYNECOLOGY

## 2023-08-10 PROCEDURE — 99215 OFFICE O/P EST HI 40 MIN: CPT | Mod: S$PBB,TH,25, | Performed by: OBSTETRICS & GYNECOLOGY

## 2023-08-10 PROCEDURE — 76819 FETAL BIOPHYS PROFIL W/O NST: CPT | Mod: 26,S$PBB,, | Performed by: OBSTETRICS & GYNECOLOGY

## 2023-08-10 PROCEDURE — 3008F PR BODY MASS INDEX (BMI) DOCUMENTED: ICD-10-PCS | Mod: CPTII,,, | Performed by: OBSTETRICS & GYNECOLOGY

## 2023-08-10 PROCEDURE — 99215 PR OFFICE/OUTPT VISIT, EST, LEVL V, 40-54 MIN: ICD-10-PCS | Mod: S$PBB,TH,25, | Performed by: OBSTETRICS & GYNECOLOGY

## 2023-08-10 PROCEDURE — 1159F PR MEDICATION LIST DOCUMENTED IN MEDICAL RECORD: ICD-10-PCS | Mod: CPTII,,, | Performed by: OBSTETRICS & GYNECOLOGY

## 2023-08-10 PROCEDURE — 76819 US MFM PROCEDURE (VIEWPOINT): ICD-10-PCS | Mod: 26,S$PBB,, | Performed by: OBSTETRICS & GYNECOLOGY

## 2023-08-10 PROCEDURE — 3077F SYST BP >= 140 MM HG: CPT | Mod: CPTII,,, | Performed by: OBSTETRICS & GYNECOLOGY

## 2023-08-10 RX ORDER — CALCIUM CARBONATE 200(500)MG
500 TABLET,CHEWABLE ORAL 3 TIMES DAILY PRN
Status: DISCONTINUED | OUTPATIENT
Start: 2023-08-10 | End: 2023-08-12

## 2023-08-10 RX ORDER — METHYLERGONOVINE MALEATE 0.2 MG/ML
200 INJECTION INTRAVENOUS
Status: DISCONTINUED | OUTPATIENT
Start: 2023-08-10 | End: 2023-08-12

## 2023-08-10 RX ORDER — SIMETHICONE 80 MG
1 TABLET,CHEWABLE ORAL 4 TIMES DAILY PRN
Status: DISCONTINUED | OUTPATIENT
Start: 2023-08-10 | End: 2023-08-12

## 2023-08-10 RX ORDER — BUTORPHANOL TARTRATE 2 MG/ML
1 INJECTION INTRAMUSCULAR; INTRAVENOUS
Status: DISCONTINUED | OUTPATIENT
Start: 2023-08-10 | End: 2023-08-12

## 2023-08-10 RX ORDER — SODIUM CHLORIDE, SODIUM LACTATE, POTASSIUM CHLORIDE, CALCIUM CHLORIDE 600; 310; 30; 20 MG/100ML; MG/100ML; MG/100ML; MG/100ML
INJECTION, SOLUTION INTRAVENOUS CONTINUOUS
Status: DISCONTINUED | OUTPATIENT
Start: 2023-08-10 | End: 2023-08-12

## 2023-08-10 RX ORDER — MISOPROSTOL 200 UG/1
800 TABLET ORAL ONCE AS NEEDED
Status: DISCONTINUED | OUTPATIENT
Start: 2023-08-10 | End: 2023-08-12

## 2023-08-10 RX ORDER — OXYTOCIN/RINGER'S LACTATE 30/500 ML
95 PLASTIC BAG, INJECTION (ML) INTRAVENOUS ONCE AS NEEDED
Status: DISCONTINUED | OUTPATIENT
Start: 2023-08-10 | End: 2023-08-12

## 2023-08-10 RX ORDER — OXYTOCIN/RINGER'S LACTATE 30/500 ML
334 PLASTIC BAG, INJECTION (ML) INTRAVENOUS ONCE AS NEEDED
Status: COMPLETED | OUTPATIENT
Start: 2023-08-10 | End: 2023-08-12

## 2023-08-10 RX ORDER — LIDOCAINE HYDROCHLORIDE 10 MG/ML
10 INJECTION, SOLUTION EPIDURAL; INFILTRATION; INTRACAUDAL; PERINEURAL ONCE AS NEEDED
Status: DISCONTINUED | OUTPATIENT
Start: 2023-08-10 | End: 2023-08-12

## 2023-08-10 RX ORDER — OXYTOCIN 10 [USP'U]/ML
10 INJECTION, SOLUTION INTRAMUSCULAR; INTRAVENOUS ONCE AS NEEDED
Status: DISCONTINUED | OUTPATIENT
Start: 2023-08-10 | End: 2023-08-12

## 2023-08-10 RX ORDER — TRANEXAMIC ACID 10 MG/ML
1000 INJECTION, SOLUTION INTRAVENOUS ONCE AS NEEDED
Status: DISCONTINUED | OUTPATIENT
Start: 2023-08-10 | End: 2023-08-12

## 2023-08-10 RX ORDER — LABETALOL 100 MG/1
100 TABLET, FILM COATED ORAL EVERY 12 HOURS
Status: DISCONTINUED | OUTPATIENT
Start: 2023-08-10 | End: 2023-08-12

## 2023-08-10 RX ORDER — CARBOPROST TROMETHAMINE 250 UG/ML
250 INJECTION, SOLUTION INTRAMUSCULAR
Status: DISCONTINUED | OUTPATIENT
Start: 2023-08-10 | End: 2023-08-12

## 2023-08-10 RX ORDER — ONDANSETRON 4 MG/1
8 TABLET, ORALLY DISINTEGRATING ORAL EVERY 8 HOURS PRN
Status: DISCONTINUED | OUTPATIENT
Start: 2023-08-10 | End: 2023-08-12

## 2023-08-10 RX ORDER — DIPHENOXYLATE HYDROCHLORIDE AND ATROPINE SULFATE 2.5; .025 MG/1; MG/1
2 TABLET ORAL EVERY 6 HOURS PRN
Status: DISCONTINUED | OUTPATIENT
Start: 2023-08-10 | End: 2023-08-12

## 2023-08-10 RX ORDER — OXYTOCIN/RINGER'S LACTATE 30/500 ML
334 PLASTIC BAG, INJECTION (ML) INTRAVENOUS ONCE
Status: DISCONTINUED | OUTPATIENT
Start: 2023-08-10 | End: 2023-08-12

## 2023-08-10 RX ORDER — SERTRALINE HYDROCHLORIDE 50 MG/1
100 TABLET, FILM COATED ORAL NIGHTLY
Status: DISCONTINUED | OUTPATIENT
Start: 2023-08-10 | End: 2023-08-12

## 2023-08-10 RX ADMIN — SERTRALINE HYDROCHLORIDE 100 MG: 50 TABLET ORAL at 09:08

## 2023-08-10 RX ADMIN — DINOPROSTONE 10 MG: 10 INSERT VAGINAL at 09:08

## 2023-08-10 RX ADMIN — LABETALOL HYDROCHLORIDE 100 MG: 100 TABLET, FILM COATED ORAL at 09:08

## 2023-08-10 NOTE — PROGRESS NOTES
"Maternal Fetal Medicine follow up consult    SUBJECTIVE:     Nirali Chavez is a 33 y.o.  female with IUP at 36w3d who is seen in follow up consultation by MFM.  Pregnancy complications include:   Problem   Suspected fetal rhizomelia   Gestational Hypertension, Third Trimester   Fetal Growth Restriction Antepartum   Tobacco Abuse       Previous notes reviewed.   No changes to medical, surgical, family, social, or obstetric history.    Interval history since last MFM visit: started on labetalol 100 bid by primary OB 1-2 weeks ago    Medications:  Current Outpatient Medications   Medication Instructions    aspirin (ECOTRIN) 81 mg, Oral, Daily    ergocalciferol, vitamin D2, (VITAMIN D ORAL) Oral, Daily, Gummies    labetaloL (NORMODYNE) 100 mg, Oral, 2 times daily    melatonin (MELATIN) 5 mg Oral, Nightly    prenatal vit no.124/iron/folic (PRENATAL VITAMIN ORAL) Oral, Daily    sertraline (ZOLOFT) 100 mg, Oral, Nightly       Care team members:  Dr. Edwards - Primary OB     OBJECTIVE:   BP (!) 147/74 (BP Location: Left arm, Patient Position: Sitting, BP Method: Large (Automatic))   Ht 5' 3" (1.6 m)   Wt 92.4 kg (203 lb 11.3 oz)   LMP 10/31/2022 (Exact Date)   BMI 36.08 kg/m²     Ultrasound performed. See viewpoint for full ultrasound report.  Severe FGR is identified on today's study. The EFW plots at the <1%, and the AC plots at the <1%.   The EFW is 1936 g.  AFV is normal, and the BPP score is reassuring at 8/8.   Umbilical artery Doppler S/D ratios are normal (93%).  We again identify a lag in long bone length, particularly in the FL (3.9 SD below the mean).  Interpretation is difficult given coexisting FGR which is symmetrical.  The FL/AC (0.2) and CC/AC (0.82) are reassuring, and there is not suspicion of a lethal skeletal dysplasia.  There is no evidence of bone fracture, and the bone mineralization / echogenicity appears normal.  Our findings are stable from the prior ultrasound assessment. "     Significant labs/imaging:  Lab Results   Component Value Date    HGB 13.5 2023    HCT 38.9 2023     2023    CREATININE 0.6 2023    AST 13 2023    ALT 13 2023    UTPCR 0.10 2023        ASSESSMENT/PLAN:     33 y.o.  female with IUP at 36w3d    Fetal growth restriction antepartum  Fetal growth restriction antepartum             Prev Counseled.   On today's exam: severely growth restricted (EFW <1% with normal UA dopplers).      Recommendations  - Given now severely growth restricted with comorbid conditions (gHTN), recommend delivery during 36wga.   - Mode of delivery not modified by this recommendation  - CBC, CMP, P/C today if IOL does not occur within next 24hr  - Continue previously outlined  surveillance if IOL does not occur within next 48hr      Suspected fetal rhizomelia  See ultrasound report for details of today's imaging. 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 At previous visit, patient was counseled on findings of lagging long bones by Dr. Fernandes. Ultrasound findings were suggestive of a possible fetal skeletal dysplasia with proximal long bones measuring 3-4 standard deviations below the mean while distal long bones lag at 2-3 SD below the mean.     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  - See results of today's imaging for details.   - Degree of lag in long bone length is similar to prior examination  - No concern for lethal skeletal dysplasia based upon chest circ/AC and FL/AC.   - Differential diagnosis includes constitutional small growth, achondroplasia. This should be assessed postnatally    Gestational hypertension, third trimester  BP today: 147/74  Previously counseled    Last labs 23 as above      Recommendations:  1.  "Preeclampsia serum labs and urine protein assessment were normal   2. Please see previous note regarding standard management for gestational hypertension and delivery timing  3. Given persistently elevated mild range blood pressure in setting of severe growth restriction, recommend IOL during 36wga. This was communicated to Dr. Edwards  4. Stop labetalol and reassess postpartum    Tobacco abuse  Pt reports she has achieved full daily cessation, though occasionally "trips up"     Recommendations:  1. Continued cessation    NORMA Mendoza MD  Maternal Fetal Medicine Fellow   PGY-5         45 minutes of total time spent on the encounter, which includes face to face time and non-face to face time preparing to see the patient (eg, review of tests), obtaining and/or reviewing separately obtained history, documenting clinical information in the electronic or other health record, independently interpreting results (not separately reported) and communicating results to the patient/family/caregiver, or care coordination (not separately reported).   "

## 2023-08-11 ENCOUNTER — ANESTHESIA EVENT (OUTPATIENT)
Dept: OBSTETRICS AND GYNECOLOGY | Facility: HOSPITAL | Age: 33
End: 2023-08-11
Payer: MEDICAID

## 2023-08-11 ENCOUNTER — ANESTHESIA (OUTPATIENT)
Dept: OBSTETRICS AND GYNECOLOGY | Facility: HOSPITAL | Age: 33
End: 2023-08-11
Payer: MEDICAID

## 2023-08-11 LAB — RPR SER QL: NORMAL

## 2023-08-11 PROCEDURE — 25000003 PHARM REV CODE 250: Performed by: SPECIALIST

## 2023-08-11 PROCEDURE — 12000002 HC ACUTE/MED SURGE SEMI-PRIVATE ROOM

## 2023-08-11 PROCEDURE — 27200710 HC EPIDURAL INFUSION PUMP SET: Performed by: ANESTHESIOLOGY

## 2023-08-11 PROCEDURE — C1751 CATH, INF, PER/CENT/MIDLINE: HCPCS | Performed by: ANESTHESIOLOGY

## 2023-08-11 PROCEDURE — 62326 NJX INTERLAMINAR LMBR/SAC: CPT | Performed by: ANESTHESIOLOGY

## 2023-08-11 PROCEDURE — 59409 OBSTETRICAL CARE: CPT | Mod: AA,,, | Performed by: ANESTHESIOLOGY

## 2023-08-11 PROCEDURE — 59409 PRA ETRICAL CARE,VAG DELIV ONLY: ICD-10-PCS | Mod: AA,,, | Performed by: ANESTHESIOLOGY

## 2023-08-11 PROCEDURE — 63600175 PHARM REV CODE 636 W HCPCS: Performed by: ANESTHESIOLOGY

## 2023-08-11 PROCEDURE — 36415 COLL VENOUS BLD VENIPUNCTURE: CPT | Performed by: SPECIALIST

## 2023-08-11 PROCEDURE — 63600175 PHARM REV CODE 636 W HCPCS: Performed by: SPECIALIST

## 2023-08-11 RX ORDER — ONDANSETRON 2 MG/ML
4 INJECTION INTRAMUSCULAR; INTRAVENOUS EVERY 6 HOURS PRN
Status: DISCONTINUED | OUTPATIENT
Start: 2023-08-11 | End: 2023-08-12

## 2023-08-11 RX ORDER — EPHEDRINE SULFATE 50 MG/ML
10 INJECTION, SOLUTION INTRAVENOUS ONCE AS NEEDED
Status: DISCONTINUED | OUTPATIENT
Start: 2023-08-11 | End: 2023-08-12

## 2023-08-11 RX ORDER — NALOXONE HCL 0.4 MG/ML
0.4 VIAL (ML) INJECTION SEE ADMIN INSTRUCTIONS
Status: DISCONTINUED | OUTPATIENT
Start: 2023-08-11 | End: 2023-08-12

## 2023-08-11 RX ORDER — ROPIVACAINE HYDROCHLORIDE 2 MG/ML
20 INJECTION, SOLUTION EPIDURAL; INFILTRATION ONCE
Status: DISCONTINUED | OUTPATIENT
Start: 2023-08-11 | End: 2023-08-12

## 2023-08-11 RX ORDER — DIPHENHYDRAMINE HYDROCHLORIDE 50 MG/ML
12.5 INJECTION INTRAMUSCULAR; INTRAVENOUS EVERY 4 HOURS PRN
Status: DISCONTINUED | OUTPATIENT
Start: 2023-08-11 | End: 2023-08-12

## 2023-08-11 RX ORDER — FENTANYL/BUPIVACAINE/NS/PF 2MCG/ML-.1
14 PLASTIC BAG, INJECTION (ML) INJECTION CONTINUOUS
Status: DISCONTINUED | OUTPATIENT
Start: 2023-08-11 | End: 2023-08-12

## 2023-08-11 RX ORDER — ROPIVACAINE HYDROCHLORIDE 2 MG/ML
20 INJECTION, SOLUTION EPIDURAL; INFILTRATION ONCE
Status: DISCONTINUED | OUTPATIENT
Start: 2023-08-11 | End: 2023-08-11

## 2023-08-11 RX ORDER — ROPIVACAINE HYDROCHLORIDE 2 MG/ML
INJECTION, SOLUTION EPIDURAL; INFILTRATION
Status: COMPLETED | OUTPATIENT
Start: 2023-08-11 | End: 2023-08-11

## 2023-08-11 RX ORDER — FENTANYL/BUPIVACAINE/NS/PF 2MCG/ML-.1
PLASTIC BAG, INJECTION (ML) INJECTION CONTINUOUS
Status: DISCONTINUED | OUTPATIENT
Start: 2023-08-11 | End: 2023-08-11

## 2023-08-11 RX ORDER — OXYTOCIN/RINGER'S LACTATE 30/500 ML
0-30 PLASTIC BAG, INJECTION (ML) INTRAVENOUS CONTINUOUS
Status: DISCONTINUED | OUTPATIENT
Start: 2023-08-11 | End: 2023-08-12

## 2023-08-11 RX ADMIN — ROPIVACAINE HYDROCHLORIDE 15 ML: 2 INJECTION, SOLUTION EPIDURAL; INFILTRATION at 08:08

## 2023-08-11 RX ADMIN — SERTRALINE HYDROCHLORIDE 100 MG: 50 TABLET ORAL at 09:08

## 2023-08-11 RX ADMIN — ONDANSETRON 4 MG: 2 INJECTION INTRAMUSCULAR; INTRAVENOUS at 10:08

## 2023-08-11 RX ADMIN — DEXTROSE MONOHYDRATE 5 MILLION UNITS: 50 INJECTION, SOLUTION INTRAVENOUS at 09:08

## 2023-08-11 RX ADMIN — LABETALOL HYDROCHLORIDE 100 MG: 100 TABLET, FILM COATED ORAL at 12:08

## 2023-08-11 RX ADMIN — MISOPROSTOL 50 MCG: 100 TABLET ORAL at 10:08

## 2023-08-11 RX ADMIN — SODIUM CHLORIDE, SODIUM LACTATE, POTASSIUM CHLORIDE, AND CALCIUM CHLORIDE: .6; .31; .03; .02 INJECTION, SOLUTION INTRAVENOUS at 04:08

## 2023-08-11 RX ADMIN — Medication 2 MILLI-UNITS/MIN: at 04:08

## 2023-08-11 NOTE — PLAN OF CARE
Problem: Adult Inpatient Plan of Care  Goal: Plan of Care Review  Outcome: Ongoing, Progressing  Goal: Patient-Specific Goal (Individualized)  Outcome: Ongoing, Progressing  Goal: Absence of Hospital-Acquired Illness or Injury  Outcome: Ongoing, Progressing  Goal: Optimal Comfort and Wellbeing  Outcome: Ongoing, Progressing  Goal: Readiness for Transition of Care  Outcome: Ongoing, Progressing     Problem:  Fall Injury Risk  Goal: Absence of Fall, Infant Drop and Related Injury  Outcome: Ongoing, Progressing     Problem: Infection  Goal: Absence of Infection Signs and Symptoms  Outcome: Ongoing, Progressing     Problem: Bleeding (Labor)  Goal: Hemostasis  Outcome: Ongoing, Progressing     Problem: Change in Fetal Wellbeing (Labor)  Goal: Stable Fetal Wellbeing  Outcome: Ongoing, Progressing     Problem: Delayed Labor Progression (Labor)  Goal: Effective Progression to Delivery  Outcome: Ongoing, Progressing     Problem: Infection (Labor)  Goal: Absence of Infection Signs and Symptoms  Outcome: Ongoing, Progressing     Problem: Labor Pain (Labor)  Goal: Acceptable Pain Control  Outcome: Ongoing, Progressing     Problem: Uterine Tachysystole (Labor)  Goal: Normal Uterine Contraction Pattern  Outcome: Ongoing, Progressing     Problem: Maternal-Fetal Wellbeing  Goal: Optimal Maternal-Fetal Wellbeing  Outcome: Ongoing, Progressing

## 2023-08-12 PROBLEM — O36.5930 POOR FETAL GROWTH AFFECTING MANAGEMENT OF MOTHER IN THIRD TRIMESTER: Status: ACTIVE | Noted: 2023-08-12

## 2023-08-12 PROCEDURE — 12000002 HC ACUTE/MED SURGE SEMI-PRIVATE ROOM

## 2023-08-12 PROCEDURE — 72200005 HC VAGINAL DELIVERY LEVEL II

## 2023-08-12 PROCEDURE — 25000003 PHARM REV CODE 250: Performed by: SPECIALIST

## 2023-08-12 PROCEDURE — 63600175 PHARM REV CODE 636 W HCPCS: Performed by: SPECIALIST

## 2023-08-12 RX ORDER — MISOPROSTOL 200 UG/1
800 TABLET ORAL ONCE AS NEEDED
Status: DISCONTINUED | OUTPATIENT
Start: 2023-08-12 | End: 2023-08-15 | Stop reason: HOSPADM

## 2023-08-12 RX ORDER — DOCUSATE SODIUM 100 MG/1
200 CAPSULE, LIQUID FILLED ORAL 2 TIMES DAILY PRN
Status: DISCONTINUED | OUTPATIENT
Start: 2023-08-12 | End: 2023-08-15 | Stop reason: HOSPADM

## 2023-08-12 RX ORDER — CARBOPROST TROMETHAMINE 250 UG/ML
250 INJECTION, SOLUTION INTRAMUSCULAR
Status: DISCONTINUED | OUTPATIENT
Start: 2023-08-12 | End: 2023-08-15 | Stop reason: HOSPADM

## 2023-08-12 RX ORDER — OXYTOCIN/RINGER'S LACTATE 30/500 ML
95 PLASTIC BAG, INJECTION (ML) INTRAVENOUS ONCE AS NEEDED
Status: DISCONTINUED | OUTPATIENT
Start: 2023-08-12 | End: 2023-08-15 | Stop reason: HOSPADM

## 2023-08-12 RX ORDER — MISOPROSTOL 200 UG/1
600 TABLET ORAL
Status: DISCONTINUED | OUTPATIENT
Start: 2023-08-12 | End: 2023-08-15 | Stop reason: HOSPADM

## 2023-08-12 RX ORDER — ONDANSETRON 4 MG/1
8 TABLET, ORALLY DISINTEGRATING ORAL EVERY 8 HOURS PRN
Status: DISCONTINUED | OUTPATIENT
Start: 2023-08-12 | End: 2023-08-15 | Stop reason: HOSPADM

## 2023-08-12 RX ORDER — METHYLERGONOVINE MALEATE 0.2 MG/ML
200 INJECTION INTRAVENOUS
Status: DISCONTINUED | OUTPATIENT
Start: 2023-08-12 | End: 2023-08-15 | Stop reason: HOSPADM

## 2023-08-12 RX ORDER — PROMETHAZINE HYDROCHLORIDE 25 MG/1
25 TABLET ORAL EVERY 6 HOURS PRN
Status: DISCONTINUED | OUTPATIENT
Start: 2023-08-12 | End: 2023-08-15 | Stop reason: HOSPADM

## 2023-08-12 RX ORDER — OXYCODONE AND ACETAMINOPHEN 10; 325 MG/1; MG/1
1 TABLET ORAL EVERY 4 HOURS PRN
Status: DISCONTINUED | OUTPATIENT
Start: 2023-08-12 | End: 2023-08-15 | Stop reason: HOSPADM

## 2023-08-12 RX ORDER — DIPHENOXYLATE HYDROCHLORIDE AND ATROPINE SULFATE 2.5; .025 MG/1; MG/1
2 TABLET ORAL EVERY 6 HOURS PRN
Status: DISCONTINUED | OUTPATIENT
Start: 2023-08-12 | End: 2023-08-15 | Stop reason: HOSPADM

## 2023-08-12 RX ORDER — DIPHENHYDRAMINE HYDROCHLORIDE 50 MG/ML
25 INJECTION INTRAMUSCULAR; INTRAVENOUS EVERY 4 HOURS PRN
Status: DISCONTINUED | OUTPATIENT
Start: 2023-08-12 | End: 2023-08-15 | Stop reason: HOSPADM

## 2023-08-12 RX ORDER — OXYTOCIN/RINGER'S LACTATE 30/500 ML
95 PLASTIC BAG, INJECTION (ML) INTRAVENOUS ONCE
Status: DISCONTINUED | OUTPATIENT
Start: 2023-08-12 | End: 2023-08-15 | Stop reason: HOSPADM

## 2023-08-12 RX ORDER — OXYTOCIN 10 [USP'U]/ML
10 INJECTION, SOLUTION INTRAMUSCULAR; INTRAVENOUS ONCE AS NEEDED
Status: DISCONTINUED | OUTPATIENT
Start: 2023-08-12 | End: 2023-08-15 | Stop reason: HOSPADM

## 2023-08-12 RX ORDER — TRANEXAMIC ACID 10 MG/ML
1000 INJECTION, SOLUTION INTRAVENOUS ONCE AS NEEDED
Status: DISCONTINUED | OUTPATIENT
Start: 2023-08-12 | End: 2023-08-15 | Stop reason: HOSPADM

## 2023-08-12 RX ORDER — OXYCODONE AND ACETAMINOPHEN 5; 325 MG/1; MG/1
1 TABLET ORAL EVERY 4 HOURS PRN
Status: DISCONTINUED | OUTPATIENT
Start: 2023-08-12 | End: 2023-08-15 | Stop reason: HOSPADM

## 2023-08-12 RX ORDER — DIPHENHYDRAMINE HCL 25 MG
25 CAPSULE ORAL EVERY 4 HOURS PRN
Status: DISCONTINUED | OUTPATIENT
Start: 2023-08-12 | End: 2023-08-15 | Stop reason: HOSPADM

## 2023-08-12 RX ORDER — HYDROCORTISONE 25 MG/G
CREAM TOPICAL 3 TIMES DAILY PRN
Status: DISCONTINUED | OUTPATIENT
Start: 2023-08-12 | End: 2023-08-15 | Stop reason: HOSPADM

## 2023-08-12 RX ORDER — OXYTOCIN/RINGER'S LACTATE 30/500 ML
334 PLASTIC BAG, INJECTION (ML) INTRAVENOUS ONCE AS NEEDED
Status: DISCONTINUED | OUTPATIENT
Start: 2023-08-12 | End: 2023-08-15 | Stop reason: HOSPADM

## 2023-08-12 RX ORDER — LABETALOL 100 MG/1
100 TABLET, FILM COATED ORAL EVERY 12 HOURS
Status: DISCONTINUED | OUTPATIENT
Start: 2023-08-12 | End: 2023-08-13

## 2023-08-12 RX ADMIN — DEXTROSE 3 MILLION UNITS: 50 INJECTION, SOLUTION INTRAVENOUS at 01:08

## 2023-08-12 RX ADMIN — OXYCODONE HYDROCHLORIDE AND ACETAMINOPHEN 1 TABLET: 10; 325 TABLET ORAL at 07:08

## 2023-08-12 RX ADMIN — Medication 334 MILLI-UNITS/MIN: at 07:08

## 2023-08-12 RX ADMIN — IBUPROFEN 600 MG: 400 TABLET ORAL at 05:08

## 2023-08-12 RX ADMIN — OXYCODONE HYDROCHLORIDE AND ACETAMINOPHEN 1 TABLET: 10; 325 TABLET ORAL at 11:08

## 2023-08-12 RX ADMIN — LABETALOL HYDROCHLORIDE 100 MG: 100 TABLET, FILM COATED ORAL at 08:08

## 2023-08-12 RX ADMIN — OXYCODONE HYDROCHLORIDE AND ACETAMINOPHEN 1 TABLET: 10; 325 TABLET ORAL at 01:08

## 2023-08-12 RX ADMIN — OXYCODONE HYDROCHLORIDE AND ACETAMINOPHEN 1 TABLET: 10; 325 TABLET ORAL at 09:08

## 2023-08-12 RX ADMIN — LABETALOL HYDROCHLORIDE 100 MG: 100 TABLET, FILM COATED ORAL at 12:08

## 2023-08-12 RX ADMIN — IBUPROFEN 600 MG: 400 TABLET ORAL at 11:08

## 2023-08-12 RX ADMIN — IBUPROFEN 600 MG: 400 TABLET ORAL at 08:08

## 2023-08-12 RX ADMIN — DEXTROSE 3 MILLION UNITS: 50 INJECTION, SOLUTION INTRAVENOUS at 05:08

## 2023-08-12 NOTE — ANESTHESIA POSTPROCEDURE EVALUATION
Anesthesia Post Evaluation    Patient: Nirali Chavez    Procedure(s) Performed: * No procedures listed *    Final Anesthesia Type: epidural      Patient location during evaluation: floor  Patient participation: Yes- Able to Participate  Level of consciousness: awake and alert  Post-procedure vital signs: reviewed and stable  Pain management: adequate  Airway patency: patent    PONV status at discharge: No PONV  Anesthetic complications: no      Cardiovascular status: blood pressure returned to baseline  Respiratory status: unassisted  Hydration status: euvolemic  Follow-up not needed.          Vitals Value Taken Time   /93 08/12/23 1528   Temp 36.6 °C (97.8 °F) 08/12/23 1528   Pulse 76 08/12/23 1528   Resp 17 08/12/23 1528   SpO2 97 % 08/12/23 1528         No case tracking events are documented in the log.      Pain/Aj Score: Pain Rating Prior to Med Admin: 7 (8/12/2023  1:41 PM)  Pain Rating Post Med Admin: 3 (8/12/2023  2:30 PM)

## 2023-08-12 NOTE — SUBJECTIVE & OBJECTIVE
Chief Complaint:  ***     Past Medical History:   Diagnosis Date    ADHD (attention deficit hyperactivity disorder)     Depression     Hypertension     IUGR (intrauterine growth restriction) affecting care of mother     Thyroid condition        Past Surgical History:   Procedure Laterality Date    TONSILLECTOMY  1996       Review of patient's allergies indicates:  No Known Allergies    No current facility-administered medications on file prior to encounter.     Current Outpatient Medications on File Prior to Encounter   Medication Sig    ergocalciferol, vitamin D2, (VITAMIN D ORAL) Take by mouth once daily. Gummies    labetaloL (NORMODYNE) 100 MG tablet Take 100 mg by mouth 2 (two) times daily.    prenatal vit no.124/iron/folic (PRENATAL VITAMIN ORAL) Take by mouth Daily.    sertraline (ZOLOFT) 100 MG tablet Take 100 mg by mouth every evening.    aspirin (ECOTRIN) 81 MG EC tablet Take 81 mg by mouth once daily.    melatonin (MELATIN) 5 mg Take by mouth every evening.        Family History       Problem Relation (Age of Onset)    ALS Maternal Grandfather    Cancer Mother          Tobacco Use    Smoking status: Every Day     Current packs/day: 0.50     Types: Cigarettes, Vaping with nicotine    Smokeless tobacco: Not on file   Substance and Sexual Activity    Alcohol use: Not Currently     Comment: socially    Drug use: No    Sexual activity: Yes     Partners: Male     Review of Systems  Objective:     Vital Signs (Most Recent):  Temp: 98.2 °F (36.8 °C) (08/12/23 0854)  Pulse: 77 (08/12/23 0854)  Resp: 17 (08/12/23 0948)  BP: (!) 151/81 (08/12/23 0854)  SpO2: 99 % (08/12/23 0854) Vital Signs (24h Range):  Temp:  [98.2 °F (36.8 °C)-98.9 °F (37.2 °C)] 98.2 °F (36.8 °C)  Pulse:  [] 77  Resp:  [16-17] 17  SpO2:  [94 %-99 %] 99 %  BP: (104-180)/(52-94) 151/81     Patient Vitals for the past 72 hrs (Last 3 readings):   Weight   08/10/23 1858 60943 g (202 lb)     Body mass index is 35.78 kg/m².    Intake/Output - Last  "3 Shifts         08/10 0700 08/11 0659 08/11 0700 08/12 0659 08/12 0700 08/13 0659    Urine (mL/kg/hr)   700 (1.4)    Blood   100    Total Output   800    Net   -800                   Lines/Drains/Airways       Peripheral Intravenous Line  Duration                  Peripheral IV - Single Lumen 08/11/23 1830 Left;Posterior Forearm <1 day                       Physical Exam       Significant Labs:  No results for input(s): "POCTGLUCOSE" in the last 48 hours.    {Results:79913}    Significant Imaging: {Imaging Review:09192286}  "

## 2023-08-12 NOTE — NURSING TRANSFER
Nursing Transfer Note      8/12/2023   9:38 AM    Nurse giving handoff:Anastacia Byrne  Nurse receiving handoff:April Odin    Reason patient is being transferred: PP care    Transfer To: 2106    Transfer via wheelchair    Transfer with personal belongings moved by significant other    Transported by BSinger    Transfer Vital Signs:  Blood Pressure:  Heart Rate:  O2:  Temperature:  Respirations:      Telemetry:   Order for Tele Monitor? No    Additional Lines:     4eyes on Skin:     Medicines sent: yes    Any special needs or follow-up needed: routine post delivery follow up care    Patient belongings transferred with patient: Yes    Chart send with patient: Yes    Notified: significant other at bedside  Patient assessed   Upon arrival to floor: yespatient oriented to room, call bell in reach, and bed in lowest position

## 2023-08-12 NOTE — ANESTHESIA PREPROCEDURE EVALUATION
08/11/2023  Nirali Chavez is a 33 y.o., female.      Pre-op Assessment    I have reviewed the Patient Summary Reports.     I have reviewed the Nursing Notes. I have reviewed the NPO Status.   I have reviewed the Medications.     Review of Systems  Anesthesia Hx:  No problems with previous Anesthesia  Denies Family Hx of Anesthesia complications.   Denies Personal Hx of Anesthesia complications.   Social:  Non-Smoker    Hematology/Oncology:  Hematology Normal        EENT/Dental:EENT/Dental Normal   Cardiovascular:   Hypertension    Pulmonary:  Pulmonary Normal    Renal/:  Renal/ Normal     Hepatic/GI:  Hepatic/GI Normal    Musculoskeletal:  Musculoskeletal Normal    Neurological:  Neurology Normal    Endocrine:  Endocrine Normal    Psych:   Psychiatric History (ADHD)          Physical Exam  General: Well nourished    Airway:  Mallampati: II   Mouth Opening: Normal  TM Distance: Normal  Tongue: Normal  Neck ROM: Normal ROM    Chest/Lungs:  Clear to auscultation, Normal Respiratory Rate    Heart:  Rate: Normal  Rhythm: Regular Rhythm        Anesthesia Plan  Type of Anesthesia, risks & benefits discussed:    Anesthesia Type: Epidural  Intra-op Monitoring Plan: Standard ASA Monitors  Post Op Pain Control Plan: IV/PO Opioids PRN  Informed Consent: Informed consent signed with the Patient and all parties understand the risks and agree with anesthesia plan.  All questions answered.   ASA Score: 2    Ready For Surgery From Anesthesia Perspective.     .

## 2023-08-12 NOTE — ANESTHESIA PROCEDURE NOTES
Epidural    Patient location during procedure: OB   Reason for block: primary anesthetic   Reason for block: labor analgesia requested by patient and obstetrician  Diagnosis: IUP   Start time: 8/11/2023 8:19 PM  Timeout: 8/11/2023 8:18 PM  End time: 8/11/2023 8:26 PM    Staffing  Performing Provider: Cirilo Lewis MD  Authorizing Provider: Cirilo Lewis MD    Staffing  Performed by: Cirilo Lewis MD  Authorized by: Cirilo Lewis MD        Preanesthetic Checklist  Completed: patient identified, IV checked, site marked, risks and benefits discussed, surgical consent, monitors and equipment checked, pre-op evaluation, timeout performed, anesthesia consent given, hand hygiene performed and patient being monitored  Preparation  Patient position: sitting  Prep: Betadine  Patient monitoring: ECG and Blood Pressure  Reason for block: primary anesthetic   Epidural  Skin Anesthetic: lidocaine 1%  Skin Wheal: 3 mL  Administration type: continuous  Approach: midline  Interspace: L3-4    Injection technique: ARASH air  Needle and Epidural Catheter  Needle type: Tuohy   Needle gauge: 17  Needle length: 3.5 inches  Needle insertion depth: 8 cm  Catheter type: springwound and multi-orifice  Catheter size: 19 G  Catheter at skin depth: 12 cm  Insertion Attempts: 1  Test dose: 3 mL of lidocaine 1.5% with Epi 1-to-200,000  Additional Documentation: no paresthesia on injection, negative aspiration for heme and CSF, no significant pain on injection and no significant complaints from patient  Needle localization: anatomical landmarks  Medications:  Volume per aspiration: 5 mL  Time between aspirations: 5 minutes   Assessment  Upper dermatomal levels - Left: T6  Right: T6   Dermatomal levels determined by alcohol wipe  Ease of block: easy  Patient's tolerance of the procedure: comfortable throughout block and no complaints  Additional Notes  Epidural dosed with Ropivacaine 0.2% x 5/5/5 mL. No inadvertent dural puncture with Tuohy.  Dural  puncture not performed with spinal needle    Medications:    Medications: ropivacaine (NAROPIN) solution 0.2% - Epidural   15 mL - 8/11/2023 8:26:00 PM

## 2023-08-12 NOTE — L&D DELIVERY NOTE
[facility]  Vaginal Delivery Note    SUMMARY     Patient underwent normal spontaneous vaginal delivery over an intact perineum no laceration.  The placenta was delivered intact and spontaneous.  The uterus and vagina were swept clean. The patient had an epidural for anesthesia . The estimated blood loss was 150 cc.  No other complications were noted. Baby delivered was a male infant with an ICU in attendance.  Of note long hyper coiled cord with true knot  Patient tolerated delivery well. Sponge needle and lap counted correctly x2. Both mother and baby are in stable condition.        Indications: Poor fetal growth affecting management of mother in third trimester  Pregnancy complicated by:   Patient Active Problem List   Diagnosis    Abnormal chromosomal and genetic finding on  screening mother    Echogenic bowel of fetus on prenatal ultrasound    Tobacco abuse    Fetal growth restriction antepartum    Suspected fetal rhizomelia    Gestational hypertension, third trimester    Poor fetal growth affecting management of mother in third trimester     Admitting GA: 36w5d

## 2023-08-12 NOTE — LACTATION NOTE
08/12/23 1000   Maternal Assessment   Breast Density Bilateral:;soft   Areola Bilateral:;elastic   Nipples Bilateral:;everted   Equipment Type   Breast Pump Type double electric, hospital grade   Breast Pump Flange Type hard   Breast Pump Flange Size 24 mm   Breast Pumping   Breast Pumping Interventions frequent pumping encouraged;post-feed pumping encouraged   Breast Pumping hand expression utilized;pre-pumping hand expression;double electric breast pump utilized     Baby in extended transition.     Mother was taught hand expression of breastmilk/colostrum. She was instructed to:  Sit upright and lean forward, if possible.  When feasible, apply warm, wet compress over breasts for a few minutes.   Perform gentle breast massage.  Form a C with her hand and place it about 1 inch back from the areola with the nipple centered between her index finger and her thumb.  Press, compress, relax:  Using her finger and thumb, apply pressure in an inward direction toward the breast without stretching the tissue, compress the breast tissue between her finger and thumb, then relax her finger and thumb. Repeat process for a few minutes.  Rotate placement of finger and thumb on the breasts to facilitate emptying.  Collect expressed breastmilk/colostrum with a spoon or cup and feed immediately to the baby, if able.  If unable to feed immediately, place breastmilk/colostrum directly into a sterile storage container for later use. Place the babys breast milk label (with the date and time of collection and the names of mother's medications) on the container. Reviewed proper handling and storage of expressed breastmilk.   Patient effectively return demonstrated and verbalized understanding.     [employer] pump, tubing, collections containers and labels brought to bedside.  Discussed proper pump setting of initiation phase.  Instructed on proper usage of pump and to adjust suction according to maximum comfort level.  Verified  appropriate flange fit.  Educated on the frequency and duration of pumping in order to promote and maintain a full milk supply.  Hands on pumping technique reviewed.  Encouraged hand expression after pumping.  Instructed on cleaning of breast pump parts.  Written instructions also given.  Pt verbalized understanding and appropriate recall for proper milk handling, collection, labeling, storage and transportation.     Collected 10 ml colostrum from hand expression and pumping. Will assist to latch baby when able to nurse.

## 2023-08-13 LAB
BASOPHILS # BLD AUTO: 0.04 K/UL (ref 0–0.2)
BASOPHILS NFR BLD: 0.3 % (ref 0–1.9)
DIFFERENTIAL METHOD: ABNORMAL
EOSINOPHIL # BLD AUTO: 0.2 K/UL (ref 0–0.5)
EOSINOPHIL NFR BLD: 1.7 % (ref 0–8)
ERYTHROCYTE [DISTWIDTH] IN BLOOD BY AUTOMATED COUNT: 13.3 % (ref 11.5–14.5)
HCT VFR BLD AUTO: 33.4 % (ref 37–48.5)
HGB BLD-MCNC: 11.5 G/DL (ref 12–16)
IMM GRANULOCYTES # BLD AUTO: 0.05 K/UL (ref 0–0.04)
IMM GRANULOCYTES NFR BLD AUTO: 0.4 % (ref 0–0.5)
LYMPHOCYTES # BLD AUTO: 3.3 K/UL (ref 1–4.8)
LYMPHOCYTES NFR BLD: 27.6 % (ref 18–48)
MCH RBC QN AUTO: 30.7 PG (ref 27–31)
MCHC RBC AUTO-ENTMCNC: 34.4 G/DL (ref 32–36)
MCV RBC AUTO: 89 FL (ref 82–98)
MONOCYTES # BLD AUTO: 0.8 K/UL (ref 0.3–1)
MONOCYTES NFR BLD: 6.4 % (ref 4–15)
NEUTROPHILS # BLD AUTO: 7.5 K/UL (ref 1.8–7.7)
NEUTROPHILS NFR BLD: 63.6 % (ref 38–73)
NRBC BLD-RTO: 0 /100 WBC
PLATELET # BLD AUTO: 208 K/UL (ref 150–450)
PMV BLD AUTO: 11.3 FL (ref 9.2–12.9)
RBC # BLD AUTO: 3.75 M/UL (ref 4–5.4)
WBC # BLD AUTO: 11.82 K/UL (ref 3.9–12.7)

## 2023-08-13 PROCEDURE — 12000002 HC ACUTE/MED SURGE SEMI-PRIVATE ROOM

## 2023-08-13 PROCEDURE — 85025 COMPLETE CBC W/AUTO DIFF WBC: CPT | Performed by: SPECIALIST

## 2023-08-13 PROCEDURE — 25000003 PHARM REV CODE 250: Performed by: SPECIALIST

## 2023-08-13 PROCEDURE — 36415 COLL VENOUS BLD VENIPUNCTURE: CPT | Performed by: SPECIALIST

## 2023-08-13 PROCEDURE — 25000003 PHARM REV CODE 250: Performed by: STUDENT IN AN ORGANIZED HEALTH CARE EDUCATION/TRAINING PROGRAM

## 2023-08-13 RX ORDER — LABETALOL 200 MG/1
200 TABLET, FILM COATED ORAL ONCE
Status: COMPLETED | OUTPATIENT
Start: 2023-08-13 | End: 2023-08-13

## 2023-08-13 RX ORDER — SERTRALINE HYDROCHLORIDE 50 MG/1
100 TABLET, FILM COATED ORAL NIGHTLY
Status: DISCONTINUED | OUTPATIENT
Start: 2023-08-13 | End: 2023-08-15 | Stop reason: HOSPADM

## 2023-08-13 RX ORDER — LABETALOL 200 MG/1
200 TABLET, FILM COATED ORAL EVERY 12 HOURS
Status: DISCONTINUED | OUTPATIENT
Start: 2023-08-13 | End: 2023-08-14

## 2023-08-13 RX ADMIN — LABETALOL HYDROCHLORIDE 200 MG: 200 TABLET, FILM COATED ORAL at 04:08

## 2023-08-13 RX ADMIN — IBUPROFEN 600 MG: 400 TABLET ORAL at 09:08

## 2023-08-13 RX ADMIN — SERTRALINE HYDROCHLORIDE 100 MG: 50 TABLET ORAL at 12:08

## 2023-08-13 RX ADMIN — LABETALOL HYDROCHLORIDE 200 MG: 200 TABLET, FILM COATED ORAL at 09:08

## 2023-08-13 RX ADMIN — SERTRALINE HYDROCHLORIDE 100 MG: 50 TABLET ORAL at 09:08

## 2023-08-13 RX ADMIN — LABETALOL HYDROCHLORIDE 100 MG: 100 TABLET, FILM COATED ORAL at 10:08

## 2023-08-13 RX ADMIN — OXYCODONE HYDROCHLORIDE AND ACETAMINOPHEN 1 TABLET: 10; 325 TABLET ORAL at 04:08

## 2023-08-13 NOTE — LACTATION NOTE
08/12/23 1800   Maternal Infant Feeding   Maternal Emotional State assist needed   Infant Positioning clutch/football;cradle   Signs of Milk Transfer audible swallow;infant jaw motion present   Pain with Feeding yes   Pain Location nipple, left   Comfort Measures Before/During Feeding infant position adjusted;latch adjusted;maternal position adjusted   Latch Assistance yes     Assisted to latch baby to left breast. Baby keeps tongue pushed against roof of mouth. Utilized nipple shield to drop tongue. Baby latched to shield after numerous attempts, but did not suck. Removed shield and baby latched to bare nipple, nursing well, on and off, with audible swallows. Mother complains of nipple pain during feeding. Gave 4.5 ml EBM via syringe. Reviewed proper use of nipple shield and basic breastfeeding instructions. Encouraged to continue hand expressing/pumping post feeding to provide supplementation to baby with EBM collected. Patient verbalizes understanding of all instructions with good recall.    Instructed on the need for a nipple shield and appropriate use:  Nipple Shield Instructions for use:  Wash hands prior to touching the shield  Moisten the edge of the nipple shield with water or lanolin before applying to help it stay in place  Turn shield intermediate inside out and center over nipple and areola  Slowly roll shield over the nipple and areola and smooth down edges.  The cut-out portion of the contact nipple shield should be positioned under the babys nose.  Hand express a little milk into the teat to facilitate latch.  Latch baby onto breast and shield so that part of the areola is in the babys mouth.  Do not latch baby only onto the teat of the shield.  Breastfeed  until baby is content  While breastfeeding with a nipple shield, baby should be under close supervision for output to monitor adequate transfer of milk and milk supply.  This should be continued until the milk supply is fully established and the  infant is consistently gaining weight.    Continued use of, and or weaning from the use of, the nipple shield should be done under the supervision of a health care provider.    Cleaning and Sanitizing:  After each use, rinse in cool water to remove breast milk  Wash in warm, soapy water  Rinse with clear water  Sanitize daily by following the instructions on [employer]s Quick Clean Micro-Steam bag or by boiling for 10min.  The nipple shield should not rest on the bottom or sides of the pot while boiling.  Allow nipple shield to air dry in a clean area and store dry with the nipple facing upward    States understanding and appropriate recall of all information, including return demonstration of use.

## 2023-08-13 NOTE — PLAN OF CARE
MARLINE contacted DCFS to make a report due to baby having a positive UDS for fentanyl. Intake was conducted by Sybil, who provided the following case number: [id number]. Sybil indicated a  would meet with family tomorrow afternoon at the hospital. Mother notified.
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
## 2023-08-13 NOTE — PROGRESS NOTES
Vaginal Delivery Postpartum Day 1     Nirali Chavez is doing well without complaints. Pain well controlled, but now only wanting non-narcotic medications after incident of dropping the baby after falling asleep while holding. Tolerating regular diet. Ambulating and voiding without difficulty. She denies any problems with pp blues. States bleeding is not heavy.     OBJECTIVE:     Vitals:    23 0400 23 0833 23 1015 23 1212   BP: (!) (P) 143/91 139/87 139/87 (!) 139/90   BP Location: (P) Left arm Right arm  Right arm   Patient Position: (P) Sitting Sitting  Sitting   Pulse: (P) 83 76  75   Resp: (P) 18 18  16   Temp: (P) 97.8 °F (36.6 °C) 98 °F (36.7 °C)  98.3 °F (36.8 °C)   TempSrc: (P) Oral Oral  Oral   SpO2: (P) 98% 96%  96%   Weight:       Height:            I & O (Last 24H):  Intake/Output Summary (Last 24 hours)    Intake/Output Summary (Last 24 hours) at 2023 1250  Last data filed at 2023 1709  Gross per 24 hour   Intake --   Output 1100 ml   Net -1100 ml         Physical Exam:  General: NAD, AAO   CV: Regular rate   Resp:   Nonlabored respirations   Abdomen: Soft, appropriately tender    Fundus: Firm   Lochia:  Appropriate   DVT Evaluation: No evidence of DVT on either side seen on physical exam.  No calf tenderness     Labs:  CBC:   Lab Results   Component Value Date/Time    WBC 11.82 2023 05:03 AM    RBC 3.75 (L) 2023 05:03 AM    HGB 11.5 (L) 2023 05:03 AM    HCT 33.4 (L) 2023 05:03 AM     2023 05:03 AM    MCV 89 2023 05:03 AM    MCH 30.7 2023 05:03 AM    MCHC 34.4 2023 05:03 AM       ABO/Rh  A POS      ASSESSMENT/PLAN:     S/p vaginal delivery, PPD# 1.   Patient Active Problem List   Diagnosis    Abnormal chromosomal and genetic finding on  screening mother    Echogenic bowel of fetus on prenatal ultrasound    Tobacco abuse    Fetal growth restriction antepartum    Suspected fetal rhizomelia    Gestational  hypertension, third trimester    Poor fetal growth affecting management of mother in third trimester        Routine postpartal care   Dispo: anticipate discharge PPD2 if meeting all criteria    Sabine Arzate MD  Obstetrics and Gynecology  [facility]

## 2023-08-13 NOTE — NURSING
"Pt BP @ 1615 on R arm was 155/105 & L arm was 173/105. Told pt to sit and relax and I would recheck BP in 10 minutes. Rechecked BP @ 1624 on R arm and it was 164/103. Pt also states she was in a little bit of pain. I told pt I would get her pain meds and notify the MD on call of BP. Dr. Arzate notified of BP @ 1626. MD ordered for 200mg labetalol PO once @ 1630 and existing 100mg labetalol PO BID changed to 200mg labetalol PO BID. Went to give pt BP and pain meds @ 1631 and pt was not in room.  stated "She went downstairs to the car to get something."      "

## 2023-08-13 NOTE — NURSING
Nurses Note -- 4 Eyes      8/11/2023   7:14 PM      Skin assessed during: Q Shift Change      [x] No Altered Skin Integrity Present    []Prevention Measures Documented      [] Yes- Altered Skin Integrity Present or Discovered   [] LDA Added if Not in Epic (Describe Wound)   [] New Altered Skin Integrity was Present on Admit and Documented in LDA   [] Wound Image Taken    Wound Care Consulted? No    Attending Nurse:  Niesha Castro RN    Second RN/Staff Member:  Milana Wei RN

## 2023-08-14 PROCEDURE — 12000002 HC ACUTE/MED SURGE SEMI-PRIVATE ROOM

## 2023-08-14 PROCEDURE — 25000003 PHARM REV CODE 250: Performed by: STUDENT IN AN ORGANIZED HEALTH CARE EDUCATION/TRAINING PROGRAM

## 2023-08-14 PROCEDURE — 25000003 PHARM REV CODE 250: Performed by: SPECIALIST

## 2023-08-14 RX ADMIN — OXYCODONE HYDROCHLORIDE AND ACETAMINOPHEN 1 TABLET: 10; 325 TABLET ORAL at 05:08

## 2023-08-14 RX ADMIN — OXYCODONE HYDROCHLORIDE AND ACETAMINOPHEN 1 TABLET: 10; 325 TABLET ORAL at 10:08

## 2023-08-14 RX ADMIN — OXYCODONE HYDROCHLORIDE AND ACETAMINOPHEN 1 TABLET: 10; 325 TABLET ORAL at 08:08

## 2023-08-14 RX ADMIN — LABETALOL HYDROCHLORIDE 200 MG: 200 TABLET, FILM COATED ORAL at 08:08

## 2023-08-14 RX ADMIN — LABETALOL HYDROCHLORIDE 300 MG: 200 TABLET, FILM COATED ORAL at 08:08

## 2023-08-14 RX ADMIN — SERTRALINE HYDROCHLORIDE 100 MG: 50 TABLET ORAL at 08:08

## 2023-08-14 RX ADMIN — OXYCODONE HYDROCHLORIDE AND ACETAMINOPHEN 1 TABLET: 10; 325 TABLET ORAL at 03:08

## 2023-08-14 RX ADMIN — IBUPROFEN 600 MG: 400 TABLET ORAL at 08:08

## 2023-08-14 NOTE — NURSING
Dr harper here at this time, assessed pt, reviewed previous blood pressures from today, has decided to keep pt another night to monitor bp's, pt will not be d/c home. Poc discussed with pt / pt v/u.  New orders received

## 2023-08-14 NOTE — PROGRESS NOTES
"                                                                                     Postpartum Day 2 Vaginal Delivery    Nirali Chavez is doing well without complaints. She denies any problems, is ambulating well, is voiding without difficulty, and states bleeding is not heavy. Her pain level is being controlled with pain meds. Baby being watched for CHARIS      OBJECTIVE:     Vital Signs (Most Recent):  BP (!) 148/98 (BP Location: Right arm, Patient Position: Lying)   Pulse 72   Temp 98.1 °F (36.7 °C) (Oral)   Resp (!) 7   Ht 5' 3" (1.6 m)   Wt 91.6 kg (202 lb)   LMP 10/31/2022 (Exact Date)   SpO2 98%   Breastfeeding Yes   BMI 35.78 kg/m²       Vital Signs Range (Last 24H):  Reviewed    I & O (Last 24H):  Intake/Output Summary (Last 24 hours)  No intake or output data in the 24 hours ending 23 1344      Physical Exam:    Abd: soft non tender, fundus firm bruising noted from fundal massage  Ext: negative alaina's sign, minimal edema  Lochia: appropriate amount rubra      CBC:   Lab Results   Component Value Date/Time    WBC 11.82 2023 05:03 AM    RBC 3.75 (L) 2023 05:03 AM    HGB 11.5 (L) 2023 05:03 AM    HCT 33.4 (L) 2023 05:03 AM     2023 05:03 AM    MCV 89 2023 05:03 AM    MCH 30.7 2023 05:03 AM    MCHC 34.4 2023 05:03 AM       ABO/Rh  A POS     S/p vag delivery   Patient Active Problem List   Diagnosis    Abnormal chromosomal and genetic finding on  screening mother    Echogenic bowel of fetus on prenatal ultrasound    Tobacco abuse    Fetal growth restriction antepartum    Suspected fetal rhizomelia    Gestational hypertension, third trimester    Poor fetal growth affecting management of mother in third trimester        Chtn cont monitor bp   Increase to 300 mg bid  "

## 2023-08-15 VITALS
TEMPERATURE: 98 F | HEIGHT: 63 IN | SYSTOLIC BLOOD PRESSURE: 146 MMHG | WEIGHT: 202 LBS | DIASTOLIC BLOOD PRESSURE: 85 MMHG | BODY MASS INDEX: 35.79 KG/M2 | OXYGEN SATURATION: 98 % | RESPIRATION RATE: 18 BRPM | HEART RATE: 76 BPM

## 2023-08-15 PROCEDURE — 25000003 PHARM REV CODE 250: Performed by: SPECIALIST

## 2023-08-15 RX ADMIN — OXYCODONE HYDROCHLORIDE AND ACETAMINOPHEN 1 TABLET: 10; 325 TABLET ORAL at 02:08

## 2023-08-15 RX ADMIN — OXYCODONE HYDROCHLORIDE AND ACETAMINOPHEN 1 TABLET: 10; 325 TABLET ORAL at 10:08

## 2023-08-15 RX ADMIN — IBUPROFEN 600 MG: 400 TABLET ORAL at 10:08

## 2023-08-15 RX ADMIN — LABETALOL HYDROCHLORIDE 300 MG: 200 TABLET, FILM COATED ORAL at 09:08

## 2023-08-15 RX ADMIN — IBUPROFEN 600 MG: 400 TABLET ORAL at 02:08

## 2023-08-15 NOTE — PROGRESS NOTES
"                                                                                     Postpartum Day 3 Vaginal Delivery    Nirali Chavez is doing well without complaints. She denies any problems with pp blues, is ambulating well, is voiding without difficulty, and states bleeding is not heavy. Her pain level is being controlled with pain meds.      OBJECTIVE:     Vital Signs (Most Recent):  BP (!) 146/85 (BP Location: Left arm, Patient Position: Sitting)   Pulse 76   Temp 97.9 °F (36.6 °C) (Oral)   Resp 18   Ht 5' 3" (1.6 m)   Wt 91.6 kg (202 lb)   LMP 10/31/2022 (Exact Date)   SpO2 98%   Breastfeeding Yes   BMI 35.78 kg/m²       Vital Signs Range (Last 24H):  Reviewed    I & O (Last 24H):  Intake/Output Summary (Last 24 hours)  No intake or output data in the 24 hours ending 08/15/23 1450      Physical Exam:    Abd: soft non tender, fundus firm   Ext: negative alaina's sign, minimal edema  Lochia: appropriate amount rubra      CBC:   Lab Results   Component Value Date/Time    WBC 11.82 2023 05:03 AM    RBC 3.75 (L) 2023 05:03 AM    HGB 11.5 (L) 2023 05:03 AM    HCT 33.4 (L) 2023 05:03 AM     2023 05:03 AM    MCV 89 2023 05:03 AM    MCH 30.7 2023 05:03 AM    MCHC 34.4 2023 05:03 AM       ABO/Rh  A POS     S/p vag delivery   Patient Active Problem List   Diagnosis    Abnormal chromosomal and genetic finding on  screening mother    Echogenic bowel of fetus on prenatal ultrasound    Tobacco abuse    Fetal growth restriction antepartum    Suspected fetal rhizomelia    Gestational hypertension, third trimester    Poor fetal growth affecting management of mother in third trimester          D/c home  See discharge orders  Follow up 4-6 weeks  Emergency room precautions   "

## 2023-08-15 NOTE — DISCHARGE INSTRUCTIONS

## 2023-08-16 NOTE — PLAN OF CARE
08/16/23 1157   Final Note   Assessment Type Final Discharge Note   Anticipated Discharge Disposition Home   What phone number can be called within the next 1-3 days to see how you are doing after discharge? [phone]   Hospital Resources/Appts/Education Provided Provided patient/caregiver with written discharge plan information   Post-Acute Status   Discharge Delays None known at this time

## 2023-08-17 ENCOUNTER — TELEPHONE (OUTPATIENT)
Dept: OBSTETRICS AND GYNECOLOGY | Facility: OTHER | Age: 33
End: 2023-08-17
Payer: MEDICAID

## 2023-08-20 LAB
LABCORP MISC TEST CODE: NORMAL
LABCORP MISC TEST NAME: NORMAL
LABCORP MISCELLANEOUS TEST: NORMAL

## 2023-08-25 NOTE — DISCHARGE SUMMARY
"[facility]  Obstetrics  Discharge Summary      Patient Name: Nirali Chavez  MRN: [id number]  Admission Date: 8/10/2023  Hospital Length of Stay: 5 days  Discharge Date and Time: 8/15/2023  6:10 PM  Attending Physician: No att. providers found   Discharging Provider: Harrison Edwards MD   Primary Care Provider: Harrison Edwards MD    HPI: No notes on file        * No surgery found *     Hospital Course:   No notes on file         Final Active Diagnoses:    Diagnosis Date Noted POA    PRINCIPAL PROBLEM:  Poor fetal growth affecting management of mother in third trimester [O36.5930] 2023 Yes      Problems Resolved During this Admission:        Significant Diagnostic Studies: Labs: CBC No results for input(s): "WBC", "HGB", "HCT", "PLT" in the last 48 hours.  Lab Results   Component Value Date    GROUPTRH A POS 08/10/2023             Immunizations     Date Immunization Status Dose Route/Site Given by    08/15/23 2011 MMR Deleted 0.5 mL Subcutaneous/     08/15/23 2011 Tdap Deleted 0.5 mL Intramuscular/           Delivery:    Episiotomy: None   Lacerations: None   Repair suture: None   Repair # of packets:     Blood loss (ml):       Birth information:  YOB: 2021   Time of birth: 6:14 AM   Sex: male   Delivery type: Vaginal, Spontaneous   Gestational Age: 36w5d     Measurements    Weight: 2010 g  Weight (lbs): 4 lb 6.9 oz  Length: 43.8 cm  Length (in): 17.25"         Delivery Clinician: Delivery Providers    Delivering clinician: Harrison Edwards MD   Provider Role    Milana Wei, RN Delivery Nurse    Blanca Al RN Registered Nurse    Dawn Benjamin, Patient Care Assistant Surgical Tech    Grace Calix, RN Pediatric Nursery    Stefanie Awad, RN Sabine Peck, NGOCP Nurse Practitioner           Additional  information:  Forceps:    Vacuum:    Breech:    Observed anomalies      Living?:     Apgars    Living status: Living  Apgar Component Scores:  1 min.: "  5 min.:  10 min.:  15 min.:  20 min.:    Skin color:  1  1       Heart rate:  2  2       Reflex irritability:  2  2       Muscle tone:  2  2       Respiratory effort:  1  2       Total:  8  9       Apgars assigned by: BARBARA COX         Placenta: Delivered:       appearance    Pending Diagnostic Studies:     None          Discharged Condition: good    Disposition: Home or Self Care    Follow Up:   Follow-up Information     Harrison Edwards MD Follow up in 1 week(s).    Specialties: Obstetrics, Obstetrics and Gynecology  Contact information:  [address] [place] [address]  [phone]                       Patient Instructions:      Diet Adult Regular     Remove dressing in 24 hours     Notify your health care provider if you experience any of the following:  temperature >100.4     Notify your health care provider if you experience any of the following:  persistent nausea and vomiting or diarrhea     Notify your health care provider if you experience any of the following:  severe uncontrolled pain     Notify your health care provider if you experience any of the following:  redness, tenderness, or signs of infection (pain, swelling, redness, odor or green/yellow discharge around incision site)     Activity as tolerated     Medications:  Discharge Medication List as of 8/15/2023  3:56 PM      START taking these medications    Details   !! labetaloL (NORMODYNE) 100 MG tablet Take 3 tablets (300 mg total) by mouth 2 (two) times a day., Starting Tue 8/15/2023, Normal      traMADoL (ULTRAM) 50 mg tablet Take 1-2 tablets ( mg total) by mouth every 6 (six) hours as needed for pain., Starting Tue 8/15/2023, Normal       !! - Potential duplicate medications found. Please discuss with provider.      CONTINUE these medications which have NOT CHANGED    Details   ergocalciferol, vitamin D2, (VITAMIN D ORAL) Take by mouth once daily. Gummies, Historical Med      !!  labetaloL (NORMODYNE) 100 MG tablet Take 100 mg by mouth 2 (two) times daily., Starting Fri 7/28/2023, Historical Med      prenatal vit no.124/iron/folic (PRENATAL VITAMIN ORAL) Take by mouth Daily., Historical Med      sertraline (ZOLOFT) 100 MG tablet Take 100 mg by mouth every evening., Starting Wed 11/2/2022, Historical Med      aspirin (ECOTRIN) 81 MG EC tablet Take 81 mg by mouth once daily., Historical Med      melatonin (MELATIN) 5 mg Take by mouth every evening., Historical Med       !! - Potential duplicate medications found. Please discuss with provider.          Harrison Edwards MD  Obstetrics  [facility]

## 2023-09-02 ENCOUNTER — PATIENT MESSAGE (OUTPATIENT)
Dept: MATERNAL FETAL MEDICINE | Facility: CLINIC | Age: 33
End: 2023-09-02
Payer: MEDICAID

## 2023-09-27 NOTE — ADDENDUM NOTE
Addendum  created 09/27/23 1804 by Cirilo Lewis MD    Attestation recorded in Intraprocedure, Flowsheet accepted, Intraprocedure Attestations filed, Intraprocedure Staff edited

## 2023-10-18 ENCOUNTER — HOSPITAL ENCOUNTER (EMERGENCY)
Facility: HOSPITAL | Age: 33
Discharge: HOME | End: 2023-10-18
Attending: EMERGENCY MEDICINE
Payer: MEDICAID

## 2023-10-18 VITALS
WEIGHT: 190 LBS | HEART RATE: 86 BPM | RESPIRATION RATE: 18 BRPM | OXYGEN SATURATION: 96 % | DIASTOLIC BLOOD PRESSURE: 71 MMHG | TEMPERATURE: 99 F | BODY MASS INDEX: 33.66 KG/M2 | HEIGHT: 63 IN | SYSTOLIC BLOOD PRESSURE: 153 MMHG

## 2023-10-18 DIAGNOSIS — R07.9 CHEST PAIN: ICD-10-CM

## 2023-10-18 DIAGNOSIS — K21.00 GASTROESOPHAGEAL REFLUX DISEASE WITH ESOPHAGITIS, UNSPECIFIED WHETHER HEMORRHAGE: Primary | ICD-10-CM

## 2023-10-18 DIAGNOSIS — Z71.1 CONCERN ABOUT PEPTIC ULCER DISEASE WITHOUT DIAGNOSIS: ICD-10-CM

## 2023-10-18 DIAGNOSIS — I10 UNCONTROLLED HYPERTENSION: ICD-10-CM

## 2023-10-18 LAB
ALBUMIN SERPL BCP-MCNC: 4.4 G/DL (ref 3.5–5.2)
ALP SERPL-CCNC: 57 U/L (ref 55–135)
ALT SERPL W/O P-5'-P-CCNC: 20 U/L (ref 10–44)
ANION GAP SERPL CALC-SCNC: 5 MMOL/L (ref 8–16)
AST SERPL-CCNC: 17 U/L (ref 10–40)
BASOPHILS # BLD AUTO: 0.06 K/UL (ref 0–0.2)
BASOPHILS NFR BLD: 0.7 % (ref 0–1.9)
BILIRUB SERPL-MCNC: 0.3 MG/DL (ref 0.1–1)
BNP SERPL-MCNC: 14 PG/ML (ref 0–99)
BUN SERPL-MCNC: 15 MG/DL (ref 6–20)
CALCIUM SERPL-MCNC: 9.7 MG/DL (ref 8.7–10.5)
CHLORIDE SERPL-SCNC: 106 MMOL/L (ref 95–110)
CO2 SERPL-SCNC: 28 MMOL/L (ref 23–29)
CREAT SERPL-MCNC: 0.8 MG/DL (ref 0.5–1.4)
DIFFERENTIAL METHOD: NORMAL
EOSINOPHIL # BLD AUTO: 0.2 K/UL (ref 0–0.5)
EOSINOPHIL NFR BLD: 2.3 % (ref 0–8)
ERYTHROCYTE [DISTWIDTH] IN BLOOD BY AUTOMATED COUNT: 12.8 % (ref 11.5–14.5)
EST. GFR  (NO RACE VARIABLE): >60 ML/MIN/1.73 M^2
GLUCOSE SERPL-MCNC: 95 MG/DL (ref 70–110)
HCT VFR BLD AUTO: 43.7 % (ref 37–48.5)
HGB BLD-MCNC: 14.7 G/DL (ref 12–16)
IMM GRANULOCYTES # BLD AUTO: 0.02 K/UL (ref 0–0.04)
IMM GRANULOCYTES NFR BLD AUTO: 0.2 % (ref 0–0.5)
LIPASE SERPL-CCNC: 21 U/L (ref 4–60)
LYMPHOCYTES # BLD AUTO: 3.6 K/UL (ref 1–4.8)
LYMPHOCYTES NFR BLD: 40.3 % (ref 18–48)
MAGNESIUM SERPL-MCNC: 2 MG/DL (ref 1.6–2.6)
MCH RBC QN AUTO: 29.6 PG (ref 27–31)
MCHC RBC AUTO-ENTMCNC: 33.6 G/DL (ref 32–36)
MCV RBC AUTO: 88 FL (ref 82–98)
MONOCYTES # BLD AUTO: 0.5 K/UL (ref 0.3–1)
MONOCYTES NFR BLD: 5.8 % (ref 4–15)
NEUTROPHILS # BLD AUTO: 4.5 K/UL (ref 1.8–7.7)
NEUTROPHILS NFR BLD: 50.7 % (ref 38–73)
NRBC BLD-RTO: 0 /100 WBC
PLATELET # BLD AUTO: 296 K/UL (ref 150–450)
PMV BLD AUTO: 10.5 FL (ref 9.2–12.9)
POTASSIUM SERPL-SCNC: 3.6 MMOL/L (ref 3.5–5.1)
PROT SERPL-MCNC: 7.2 G/DL (ref 6–8.4)
RBC # BLD AUTO: 4.96 M/UL (ref 4–5.4)
SODIUM SERPL-SCNC: 139 MMOL/L (ref 136–145)
TROPONIN I SERPL HS-MCNC: 4.4 PG/ML (ref 0–14.9)
TROPONIN I SERPL HS-MCNC: 5 PG/ML (ref 0–14.9)
WBC # BLD AUTO: 8.84 K/UL (ref 3.9–12.7)

## 2023-10-18 PROCEDURE — 93005 ELECTROCARDIOGRAM TRACING: CPT | Performed by: INTERNAL MEDICINE

## 2023-10-18 PROCEDURE — 25000003 PHARM REV CODE 250: Performed by: EMERGENCY MEDICINE

## 2023-10-18 PROCEDURE — 93010 EKG 12-LEAD: ICD-10-PCS | Mod: ,,, | Performed by: INTERNAL MEDICINE

## 2023-10-18 PROCEDURE — 80053 COMPREHEN METABOLIC PANEL: CPT | Performed by: EMERGENCY MEDICINE

## 2023-10-18 PROCEDURE — 85025 COMPLETE CBC W/AUTO DIFF WBC: CPT | Performed by: EMERGENCY MEDICINE

## 2023-10-18 PROCEDURE — 99284 EMERGENCY DEPT VISIT MOD MDM: CPT | Mod: 25

## 2023-10-18 PROCEDURE — 93010 ELECTROCARDIOGRAM REPORT: CPT | Mod: ,,, | Performed by: INTERNAL MEDICINE

## 2023-10-18 PROCEDURE — 83690 ASSAY OF LIPASE: CPT | Performed by: EMERGENCY MEDICINE

## 2023-10-18 PROCEDURE — 84484 ASSAY OF TROPONIN QUANT: CPT | Performed by: EMERGENCY MEDICINE

## 2023-10-18 PROCEDURE — 83735 ASSAY OF MAGNESIUM: CPT | Performed by: EMERGENCY MEDICINE

## 2023-10-18 PROCEDURE — 83880 ASSAY OF NATRIURETIC PEPTIDE: CPT | Performed by: EMERGENCY MEDICINE

## 2023-10-18 PROCEDURE — 36415 COLL VENOUS BLD VENIPUNCTURE: CPT | Performed by: EMERGENCY MEDICINE

## 2023-10-18 RX ORDER — MAG HYDROX/ALUMINUM HYD/SIMETH 200-200-20
30 SUSPENSION, ORAL (FINAL DOSE FORM) ORAL ONCE
Status: COMPLETED | OUTPATIENT
Start: 2023-10-18 | End: 2023-10-18

## 2023-10-18 RX ORDER — FAMOTIDINE 10 MG/ML
20 INJECTION INTRAVENOUS
Status: COMPLETED | OUTPATIENT
Start: 2023-10-18 | End: 2023-10-18

## 2023-10-18 RX ORDER — LABETALOL 100 MG/1
100 TABLET, FILM COATED ORAL 2 TIMES DAILY
Qty: 60 TABLET | Refills: 1 | Status: SHIPPED | OUTPATIENT
Start: 2023-10-18 | End: 2024-10-17

## 2023-10-18 RX ORDER — LABETALOL 100 MG/1
100 TABLET, FILM COATED ORAL
Status: COMPLETED | OUTPATIENT
Start: 2023-10-18 | End: 2023-10-18

## 2023-10-18 RX ORDER — LIDOCAINE HYDROCHLORIDE 20 MG/ML
15 SOLUTION OROPHARYNGEAL ONCE
Status: COMPLETED | OUTPATIENT
Start: 2023-10-18 | End: 2023-10-18

## 2023-10-18 RX ORDER — FAMOTIDINE 20 MG/1
20 TABLET, FILM COATED ORAL 2 TIMES DAILY
Qty: 60 TABLET | Refills: 0 | Status: SHIPPED | OUTPATIENT
Start: 2023-10-18 | End: 2023-11-17

## 2023-10-18 RX ADMIN — LABETALOL HYDROCHLORIDE 100 MG: 100 TABLET, FILM COATED ORAL at 06:10

## 2023-10-18 RX ADMIN — ALUMINUM HYDROXIDE, MAGNESIUM HYDROXIDE, AND SIMETHICONE 30 ML: 200; 200; 20 SUSPENSION ORAL at 05:10

## 2023-10-18 RX ADMIN — LIDOCAINE HYDROCHLORIDE 15 ML: 20 SOLUTION ORAL at 05:10

## 2023-10-18 RX ADMIN — FAMOTIDINE 20 MG: 10 INJECTION INTRAVENOUS at 05:10

## 2023-10-18 NOTE — Clinical Note
"Nirali Chavez (Ashley) was seen and treated in our emergency department on 10/18/2023.  She may return to work on 10/22/2023.       If you have any questions or concerns, please don't hesitate to call.      Fortino ALMAZAN    "

## 2023-10-18 NOTE — ED PROVIDER NOTES
"Encounter Date: 10/18/2023       History     Chief Complaint   Patient presents with    Chest Pain     Woke from nap and had cp approx 1530, then took bp    Hypertension     199/114     33-year-old female with past medical history of hypertension, ADHD, depression, history of drug abuse on Suboxone intermittently, presents emergency department with chest pain and high blood pressure.  She says that she started on labetalol in the last trimester of her pregnancy for high blood pressure.  She is been taking 100 mg a day ever since.  She says that she no sleeping today and woke up and had some chest pain which she described as a burning in her chest and she can feel it in her throat.  She says she can feel something "slimy in the back of my throat".  She says that she is usually reflux and takes Mylanta which usually helps.  She says today the Mylanta did not help and she started to go get things and she became extremely nervous so she came to the ER.  She says that blood pressure was 199/114 when she checked it.  She does not have any known history of coronary artery disease.  She does not have any history of PE or DVT.  She is not a diabetic.  She does smoke cigarettes.  She does not have any history of hyperlipidemia.      Review of patient's allergies indicates:  No Known Allergies  Past Medical History:   Diagnosis Date    ADHD (attention deficit hyperactivity disorder)     Depression     Hypertension     IUGR (intrauterine growth restriction) affecting care of mother     Thyroid condition      Past Surgical History:   Procedure Laterality Date    TONSILLECTOMY  1996     Family History   Problem Relation Age of Onset    ALS Maternal Grandfather     Cancer Mother      Social History     Tobacco Use    Smoking status: Every Day     Current packs/day: 0.50     Types: Cigarettes, Vaping with nicotine   Substance Use Topics    Alcohol use: Not Currently     Comment: socially    Drug use: No     Review of Systems "   Constitutional:  Negative for chills and fever.   HENT:  Positive for congestion. Negative for sore throat.    Respiratory:  Negative for cough and shortness of breath.    Cardiovascular:  Positive for chest pain. Negative for palpitations.   Gastrointestinal:  Negative for abdominal pain, nausea and vomiting.   Genitourinary:  Negative for dysuria.   Musculoskeletal:  Negative for back pain.   Skin:  Negative for rash.   Neurological:  Negative for weakness.   Hematological:  Does not bruise/bleed easily.   All other systems reviewed and are negative.      Physical Exam     Initial Vitals [10/18/23 1609]   BP Pulse Resp Temp SpO2   (!) 186/121 86 18 98.9 °F (37.2 °C) 96 %      MAP       --         Physical Exam    Nursing note and vitals reviewed.  Constitutional: She appears well-developed and well-nourished. No distress.   HENT:   Head: Normocephalic and atraumatic.   Mouth/Throat: No oropharyngeal exudate.   Eyes: Conjunctivae and EOM are normal. Pupils are equal, round, and reactive to light.   Neck: Neck supple. No tracheal deviation present.   Cardiovascular:  Normal rate, regular rhythm, normal heart sounds and intact distal pulses.           No murmur heard.  Pulmonary/Chest: Breath sounds normal. No stridor. No respiratory distress. She has no wheezes. She has no rhonchi. She has no rales.   Abdominal: Abdomen is soft. She exhibits no distension. There is no abdominal tenderness.   No tenderness to palpation There is no rebound and no guarding.   Musculoskeletal:         General: No tenderness or edema. Normal range of motion.      Cervical back: Neck supple.     Neurological: She is alert and oriented to person, place, and time. She has normal strength. No cranial nerve deficit or sensory deficit.   Skin: Skin is warm and dry. Capillary refill takes less than 2 seconds. No rash noted. No erythema. No pallor.   Psychiatric: She has a normal mood and affect. Her behavior is normal. Judgment and thought  content normal.         ED Course   Procedures  Labs Reviewed   COMPREHENSIVE METABOLIC PANEL - Abnormal; Notable for the following components:       Result Value    Anion Gap 5 (*)     All other components within normal limits   MAGNESIUM   CBC W/ AUTO DIFFERENTIAL   TROPONIN I HIGH SENSITIVITY   TROPONIN I HIGH SENSITIVITY   B-TYPE NATRIURETIC PEPTIDE   LIPASE   LIPASE        ECG Results              EKG 12-lead (In process)  Result time 10/18/23 16:16:12      In process by Interface, Lab In [facility] (10/18/23 16:16:12)                   Narrative:    Test Reason : R07.9,    Vent. Rate : 063 BPM     Atrial Rate : 063 BPM     P-R Int : 152 ms          QRS Dur : 078 ms      QT Int : 416 ms       P-R-T Axes : 040 048 027 degrees     QTc Int : 425 ms    Normal sinus rhythm with sinus arrhythmia  Normal ECG  No previous ECGs available    Referred By: [email or web address]   SELF           Confirmed By:                                   Imaging Results              X-Ray Chest AP Portable (Final result)  Result time 10/18/23 18:49:45      Final result by Shukri Weiss MD (10/18/23 18:49:45)                   Narrative:      EXAM: XR CHEST AP PORTABLE    HISTORY: Chest Pain    COMPARISON:None    FINDINGS: The heart, lungs and mediastinal structures appear normal. There are no infiltrates or effusions. There is no pneumothorax.    IMPRESSION:   Normal chest x-ray.    Electronically signed by:  Shukri Weiss MD  10/18/2023 06:49 PM CDT Workstation: [id number]                                  Results for orders placed or performed during the hospital encounter of 10/18/23   Magnesium   Result Value Ref Range    Magnesium 2.0 1.6 - 2.6 mg/dL   CBC auto differential   Result Value Ref Range    WBC 8.84 3.90 - 12.70 K/uL    RBC 4.96 4.00 - 5.40 M/uL    Hemoglobin 14.7 12.0 - 16.0 g/dL    Hematocrit 43.7 37.0 - 48.5 %    MCV 88 82 - 98 fL    MCH 29.6 27.0 - 31.0 pg    MCHC 33.6 32.0 - 36.0 g/dL    RDW 12.8 11.5 - 14.5 %    Platelets  296 150 - 450 K/uL    MPV 10.5 9.2 - 12.9 fL    Immature Granulocytes 0.2 0.0 - 0.5 %    Gran # (ANC) 4.5 1.8 - 7.7 K/uL    Immature Grans (Abs) 0.02 0.00 - 0.04 K/uL    Lymph # 3.6 1.0 - 4.8 K/uL    Mono # 0.5 0.3 - 1.0 K/uL    Eos # 0.2 0.0 - 0.5 K/uL    Baso # 0.06 0.00 - 0.20 K/uL    nRBC 0 0 /100 WBC    Gran % 50.7 38.0 - 73.0 %    Lymph % 40.3 18.0 - 48.0 %    Mono % 5.8 4.0 - 15.0 %    Eosinophil % 2.3 0.0 - 8.0 %    Basophil % 0.7 0.0 - 1.9 %    Differential Method Automated    Comprehensive metabolic panel   Result Value Ref Range    Sodium 139 136 - 145 mmol/L    Potassium 3.6 3.5 - 5.1 mmol/L    Chloride 106 95 - 110 mmol/L    CO2 28 23 - 29 mmol/L    Glucose 95 70 - 110 mg/dL    BUN 15 6 - 20 mg/dL    Creatinine 0.8 0.5 - 1.4 mg/dL    Calcium 9.7 8.7 - 10.5 mg/dL    Total Protein 7.2 6.0 - 8.4 g/dL    Albumin 4.4 3.5 - 5.2 g/dL    Total Bilirubin 0.3 0.1 - 1.0 mg/dL    Alkaline Phosphatase 57 55 - 135 U/L    AST 17 10 - 40 U/L    ALT 20 10 - 44 U/L    eGFR >60.0 >60 mL/min/1.73 m^2    Anion Gap 5 (L) 8 - 16 mmol/L   Troponin I High Sensitivity #1   Result Value Ref Range    Troponin I High Sensitivity 5.0 0.0 - 14.9 pg/mL   Troponin I High Sensitivity #2   Result Value Ref Range    Troponin I High Sensitivity 4.4 0.0 - 14.9 pg/mL   B-Type natriuretic peptide (BNP)   Result Value Ref Range    BNP 14 0 - 99 pg/mL   Lipase   Result Value Ref Range    Lipase 21 4 - 60 U/L          Medications   labetaloL tablet 100 mg (100 mg Oral Given 10/18/23 1812)   famotidine (PF) injection 20 mg (20 mg Intravenous Given by Other 10/18/23 1756)   aluminum-magnesium hydroxide-simethicone 200-200-20 mg/5 mL suspension 30 mL (30 mLs Oral Given 10/18/23 1755)     And   LIDOcaine HCl 2% oral solution 15 mL (15 mLs Oral Given 10/18/23 1755)     Medical Decision Making  Differential includes but not limited to uncontrolled hypertension, ACS, reflux, esophagitis, alcohol abuse, peptic ulcer disease, Rodriguez's esophagus,  electrolyte abnormality, dehydration, pancreatitis      Emergent evaluation of a 33-year-old female presents emergency department above-mentioned complaints.  Overall impression is that she has uncontrolled reflux causing her symptoms.  She is had 2 troponins which are negative I doubt acute coronary syndrome, EKG nonischemic, she felt much better after GI cocktail and Pepcid.  Says symptoms are gone.  She is had H pylori in the past and reflux in the past actually says she is supposed to be on reflux medicine but does not take it for concern of long-term side effects of PPI.  Patient says that she is in agreement with close GI follow-up and likely will benefit from scope/upper endoscopy.  She will return if her symptoms change or worsen.  Doubt PE, doubt ACS doubt dissection, doubt any acute life-threatening pathology on today's emergency department visit she is a nonsurgical abdominal exam doubt any abdominal pathology.  She will be discharged home follow-up with GI and PCP.    A dictation software program was used for this note.  Please expect some simple typographical  errors in this note.     Risk  OTC drugs.  Prescription drug management.               ED Course as of 10/18/23 2029   Wed Oct 18, 2023   1709 EKG 4:12 p.m. normal sinus rhythm rate of 63.  No ST elevation or depression.  No evidence of ischemia.  Sinus arrhythmia noted.  No STEMI.  EKG interpreted independently by me.   [JR]   2001 I re-evaluated the patient, she is feeling better.  GI symptoms gone reflexes gone burning in the chest is gone after GI cocktail [JR]      ED Course User Index  [JR] Sancho Rico DO                    Clinical Impression:   Final diagnoses:  [R07.9] Chest pain  [K21.00] Gastroesophageal reflux disease with esophagitis, unspecified whether hemorrhage (Primary)  [Z71.1] Concern about peptic ulcer disease without diagnosis  [I10] Uncontrolled hypertension        ED Disposition Condition    Discharge Stable           ED Prescriptions       Medication Sig Dispense Start Date End Date Auth. Provider    labetaloL (NORMODYNE) 100 MG tablet Take 1 tablet (100 mg total) by mouth 2 (two) times daily. 60 tablet 10/18/2023 10/17/2024 Sancho Rico DO    famotidine (PEPCID) 20 MG tablet Take 1 tablet (20 mg total) by mouth 2 (two) times daily. 60 tablet 10/18/2023 11/17/2023 Sancho Rico DO          Follow-up Information       Follow up With Specialties Details Why Contact Info Additional Information    [facility] - Emergency Dept Emergency Medicine  If symptoms worsen [address] [facility] [id number] [phone] 1st floor    Ankur Horton III, MD Gastroenterology In 3 days  [address] [facility] [address]  [phone]                Sancho Rico DO  10/18/23 2029

## 2023-10-18 NOTE — FIRST PROVIDER EVALUATION
Emergency Department TeleTriage Encounter Note      CHIEF COMPLAINT    Chief Complaint   Patient presents with    Chest Pain     Woke from nap and had cp approx 1530, then took bp    Hypertension     199/114       VITAL SIGNS   Initial Vitals [10/18/23 1609]   BP Pulse Resp Temp SpO2   (!) 186/121 86 18 98.9 °F (37.2 °C) 96 %      MAP       --            ALLERGIES    Review of patient's allergies indicates:  No Known Allergies    PROVIDER TRIAGE NOTE  Patient presents with complaint of chest pain, burning in the chest and shortness of breath approximately an hour and a half ago.  She reports history of similar symptoms in the past.      Phy:   Constitutional: well nourished, well developed, appearing stated age, NAD        Initial orders will be placed and care will be transferred to an alternate provider when patient is roomed for a full evaluation. Any additional orders and the final disposition will be determined by that provider.        ORDERS  Labs Reviewed   MAGNESIUM   CBC W/ AUTO DIFFERENTIAL   COMPREHENSIVE METABOLIC PANEL   TROPONIN I HIGH SENSITIVITY   TROPONIN I HIGH SENSITIVITY   B-TYPE NATRIURETIC PEPTIDE       ED Orders (720h ago, onward)      Start Ordered     Status Ordering Provider    10/18/23 1811 10/18/23 1610  Troponin I High Sensitivity #2  Once Timed         Acknowledged AGUSTINA ZEE    10/18/23 1611 10/18/23 1610  Magnesium  STAT         In process AGUSTINA ZEE    10/18/23 1611 10/18/23 1610  Vital signs  Every 15 min         Acknowledged AGUSTINA ZEE    10/18/23 1611 10/18/23 1610  Cardiac Monitoring - Adult  Continuous        Comments: Notify Physician If:    Acknowledged AGUSTINA ZEE    10/18/23 1611 10/18/23 1610  Pulse Oximetry Continuous  Continuous         Acknowledged AGUSTINA ZEE    10/18/23 1611 10/18/23 1610  Diet NPO  Diet effective now         Acknowledged AGUSTINA ZEE    10/18/23 1611 10/18/23 1610  Saline lock IV  Once         Completed by  KOCHER, TAYLOR on 10/18/2023 at  4:45 PM AGUSTINA ZEE    10/18/23 1611 10/18/23 1610  EKG 12-lead  Once        Comments: Do not perform if previously done during this visit/ triage    In process AGUSTINA ZEE    10/18/23 1611 10/18/23 1610  CBC auto differential  STAT         In process AGUSTINA ZEE    10/18/23 1611 10/18/23 1610  Comprehensive metabolic panel  STAT         In process AGUSTINA ZEE    10/18/23 1611 10/18/23 1610  Troponin I High Sensitivity #1  STAT         In process AGUSTINA ZEE    10/18/23 1611 10/18/23 1610  B-Type natriuretic peptide (BNP)  STAT         In process AGUSTINA ZEE    10/18/23 1611 10/18/23 1610  X-Ray Chest AP Portable  1 time imaging         Acknowledged AGUSTINA ZEE              Virtual Visit Note: The provider triage portion of this emergency department evaluation and documentation was performed via [email or web address], a HIPAA-compliant telemedicine application, in concert with a tele-presenter in the room. A face to face patient evaluation with one of my colleagues will occur once the patient is placed in an emergency department room.      DISCLAIMER: This note was prepared with [employer] voice recognition transcription software. Garbled syntax, mangled pronouns, and other bizarre constructions may be attributed to that software system.

## 2023-10-18 NOTE — Clinical Note
"Nirali Chavez (Ashley) was seen and treated in our emergency department on 10/18/2023.  She may return to work on 10/23/2023.       If you have any questions or concerns, please don't hesitate to call.      Yohannes Oneil RN    "

## 2023-10-19 NOTE — DISCHARGE INSTRUCTIONS
RETURN TO EMERGENCY DEPARTMENT WITHOUT FAIL, IF YOUR SYMPTOMS WORSEN, IF YOU GET NEW OR DIFFERENT SYMPTOMS, IF YOU ARE UNABLE TO FOLLOW UP AS DIRECTED, OR IF YOU HAVE ANY CONCERNS OR WORRIES.    Follow-up with Gastroenterology on an outpatient basis.  Take Pepcid.  Stay away from spicy/greasy foods.  Stay away from alcohol.  Take labetalol twice daily.  Follow-up with PCP.

## 2023-10-23 PROBLEM — O13.3 GESTATIONAL HYPERTENSION, THIRD TRIMESTER: Status: RESOLVED | Noted: 2023-07-20 | Resolved: 2023-10-23

## 2023-11-20 PROBLEM — O36.5930 POOR FETAL GROWTH AFFECTING MANAGEMENT OF MOTHER IN THIRD TRIMESTER: Status: RESOLVED | Noted: 2023-08-12 | Resolved: 2023-11-20

## 2024-05-29 NOTE — ASSESSMENT & PLAN NOTE
"Pt reports she has achieved full daily cessation, though occasionally "trips up"     Recommendations:  1. Continued cessation  "
BP today: 147/74  Previously counseled    Last labs 7/25/23 as above      Recommendations:  1. Preeclampsia serum labs and urine protein assessment were normal   2. Please see previous note regarding standard management for gestational hypertension and delivery timing  3. Given persistently elevated mild range blood pressure in setting of severe growth restriction, recommend IOL during 36wga. This was communicated to Dr. Edwards  4. Labs ordered today  5. Stop labetalol and reassess postpartum  
Fetal growth restriction antepartum             Prev Counseled.   On today's exam: severely growth restricted (EFW <1% with normal UA dopplers).      Recommendations  - Given now severely growth restricted with comorbid conditions (gHTN), recommend delivery during 36wga.   - Mode of delivery not modified by this recommendation  - CBC, CMP, P/C today if IOL does not occur within next 24hr  - Continue previously outlined  surveillance if IOL does not occur within next 48hr    
See ultrasound report for details of today's imaging. 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 At previous visit, patient was counseled on findings of lagging long bones by Dr. Fernandes. 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  - See results of today's imaging for details.   - Degree of lag in long bone length is similar to prior examination  - No concern for lethal skeletal dysplasia based upon chest circ/AC and FL/AC.   - Differential diagnosis includes constitutional small growth, achondroplasia. This should be assessed postnatally  
none